# Patient Record
Sex: FEMALE | Race: WHITE | NOT HISPANIC OR LATINO | URBAN - METROPOLITAN AREA
[De-identification: names, ages, dates, MRNs, and addresses within clinical notes are randomized per-mention and may not be internally consistent; named-entity substitution may affect disease eponyms.]

---

## 2018-12-07 ENCOUNTER — OUTPATIENT (OUTPATIENT)
Dept: OUTPATIENT SERVICES | Facility: HOSPITAL | Age: 48
LOS: 1 days | End: 2018-12-07
Payer: COMMERCIAL

## 2018-12-07 ENCOUNTER — APPOINTMENT (OUTPATIENT)
Dept: ULTRASOUND IMAGING | Facility: CLINIC | Age: 48
End: 2018-12-07
Payer: COMMERCIAL

## 2018-12-07 ENCOUNTER — APPOINTMENT (OUTPATIENT)
Dept: MAMMOGRAPHY | Facility: CLINIC | Age: 48
End: 2018-12-07
Payer: COMMERCIAL

## 2018-12-07 DIAGNOSIS — Z00.8 ENCOUNTER FOR OTHER GENERAL EXAMINATION: ICD-10-CM

## 2018-12-07 PROCEDURE — 77063 BREAST TOMOSYNTHESIS BI: CPT | Mod: 26

## 2018-12-07 PROCEDURE — 76641 ULTRASOUND BREAST COMPLETE: CPT | Mod: 26,50

## 2018-12-07 PROCEDURE — 76641 ULTRASOUND BREAST COMPLETE: CPT

## 2018-12-07 PROCEDURE — 77067 SCR MAMMO BI INCL CAD: CPT | Mod: 26

## 2018-12-07 PROCEDURE — 77067 SCR MAMMO BI INCL CAD: CPT

## 2018-12-07 PROCEDURE — 77063 BREAST TOMOSYNTHESIS BI: CPT

## 2020-09-09 ENCOUNTER — APPOINTMENT (OUTPATIENT)
Dept: INTERNAL MEDICINE | Facility: CLINIC | Age: 50
End: 2020-09-09
Payer: COMMERCIAL

## 2020-09-09 ENCOUNTER — NON-APPOINTMENT (OUTPATIENT)
Age: 50
End: 2020-09-09

## 2020-09-09 VITALS — BODY MASS INDEX: 21.93 KG/M2 | WEIGHT: 130 LBS | TEMPERATURE: 98 F | HEIGHT: 64.5 IN

## 2020-09-09 VITALS — RESPIRATION RATE: 14 BRPM | SYSTOLIC BLOOD PRESSURE: 120 MMHG | HEART RATE: 72 BPM | DIASTOLIC BLOOD PRESSURE: 70 MMHG

## 2020-09-09 DIAGNOSIS — J30.2 OTHER SEASONAL ALLERGIC RHINITIS: ICD-10-CM

## 2020-09-09 DIAGNOSIS — Z82.3 FAMILY HISTORY OF STROKE: ICD-10-CM

## 2020-09-09 DIAGNOSIS — Z83.0 FAMILY HISTORY OF HUMAN IMMUNODEFICIENCY VIRUS [HIV] DISEASE: ICD-10-CM

## 2020-09-09 DIAGNOSIS — Z23 ENCOUNTER FOR IMMUNIZATION: ICD-10-CM

## 2020-09-09 DIAGNOSIS — J45.909 UNSPECIFIED ASTHMA, UNCOMPLICATED: ICD-10-CM

## 2020-09-09 DIAGNOSIS — Z72.0 TOBACCO USE: ICD-10-CM

## 2020-09-09 DIAGNOSIS — Z82.0 FAMILY HISTORY OF EPILEPSY AND OTHER DISEASES OF THE NERVOUS SYSTEM: ICD-10-CM

## 2020-09-09 DIAGNOSIS — Z86.19 PERSONAL HISTORY OF OTHER INFECTIOUS AND PARASITIC DISEASES: ICD-10-CM

## 2020-09-09 PROCEDURE — 99203 OFFICE O/P NEW LOW 30 MIN: CPT | Mod: 25

## 2020-09-09 PROCEDURE — 93000 ELECTROCARDIOGRAM COMPLETE: CPT | Mod: 59

## 2020-09-09 PROCEDURE — G0444 DEPRESSION SCREEN ANNUAL: CPT | Mod: NC,59

## 2020-09-09 PROCEDURE — 36415 COLL VENOUS BLD VENIPUNCTURE: CPT

## 2020-09-09 PROCEDURE — 99386 PREV VISIT NEW AGE 40-64: CPT | Mod: 25

## 2020-09-09 RX ORDER — BUPROPION HYDROCHLORIDE 75 MG/1
75 TABLET, FILM COATED ORAL
Refills: 0 | Status: DISCONTINUED | COMMUNITY
Start: 2020-09-09 | End: 2020-09-09

## 2020-09-09 NOTE — ASSESSMENT
[FreeTextEntry1] : Blood work was drawn and sent to the lab today. The patient has been instructed to call the office next week to discuss today's lab work.\par \par Begin cymbalta 20mg daily\par D/C wellbutrin\par \par D/C vaping\par Routine OBGYN / Mammogram/ Breast sono\par \par Routine health counselling provided\par Annual CC\par F/U one month to reassess cymbalta.

## 2020-09-09 NOTE — PHYSICAL EXAM
[No Acute Distress] : no acute distress [Well Nourished] : well nourished [Well Developed] : well developed [Well-Appearing] : well-appearing [PERRL] : pupils equal round and reactive to light [Normal Sclera/Conjunctiva] : normal sclera/conjunctiva [EOMI] : extraocular movements intact [Normal Outer Ear/Nose] : the outer ears and nose were normal in appearance [Normal Oropharynx] : the oropharynx was normal [No JVD] : no jugular venous distention [Thyroid Normal, No Nodules] : the thyroid was normal and there were no nodules present [No Lymphadenopathy] : no lymphadenopathy [Supple] : supple [No Accessory Muscle Use] : no accessory muscle use [No Respiratory Distress] : no respiratory distress  [Clear to Auscultation] : lungs were clear to auscultation bilaterally [Normal Rate] : normal rate  [Regular Rhythm] : with a regular rhythm [No Carotid Bruits] : no carotid bruits [No Murmur] : no murmur heard [Normal S1, S2] : normal S1 and S2 [No Abdominal Bruit] : a ~M bruit was not heard ~T in the abdomen [Pedal Pulses Present] : the pedal pulses are present [No Varicosities] : no varicosities [No Palpable Aorta] : no palpable aorta [No Extremity Clubbing/Cyanosis] : no extremity clubbing/cyanosis [No Edema] : there was no peripheral edema [No Nipple Discharge] : no nipple discharge [No Axillary Lymphadenopathy] : no axillary lymphadenopathy [Normal Appearance] : normal in appearance [Non-distended] : non-distended [Soft] : abdomen soft [Non Tender] : non-tender [Normal Posterior Cervical Nodes] : no posterior cervical lymphadenopathy [No Masses] : no abdominal mass palpated [Normal Bowel Sounds] : normal bowel sounds [No HSM] : no HSM [Normal Anterior Cervical Nodes] : no anterior cervical lymphadenopathy [No CVA Tenderness] : no CVA  tenderness [No Spinal Tenderness] : no spinal tenderness [No Joint Swelling] : no joint swelling [No Rash] : no rash [Grossly Normal Strength/Tone] : grossly normal strength/tone [Normal Gait] : normal gait [Coordination Grossly Intact] : coordination grossly intact [Normal Affect] : the affect was normal [No Focal Deficits] : no focal deficits [Normal Insight/Judgement] : insight and judgment were intact [de-identified] : s/p b/l reduction [de-identified] : EDWARD Navarro last pap 2/20 neg

## 2020-09-09 NOTE — HISTORY OF PRESENT ILLNESS
[FreeTextEntry1] : HAMLET DAVIS is a 50 year old female  presents for an annual physical. Patient is also here for f/u of chronic medical conditions, which have been stable on medications. These include anxiety\par  [de-identified] : Having worsening anxiety - fear like symptoms\par taking xanax almost daily.

## 2020-09-09 NOTE — HEALTH RISK ASSESSMENT
[Very Good] : ~his/her~ current health as very good [No falls in past year] : Patient reported no falls in the past year [No] : No [0] : 2) Feeling down, depressed, or hopeless: Not at all (0) [Patient reported mammogram was normal] : Patient reported mammogram was normal [Patient reported PAP Smear was normal] : Patient reported PAP Smear was normal [HIV test declined] : HIV test declined [Hepatitis C test declined] : Hepatitis C test declined [Employed] : employed [With Significant Other] : lives with significant other [# Of Children ___] : has [unfilled] children [] :  [Sexually Active] : sexually active [Fully functional (bathing, dressing, toileting, transferring, walking, feeding)] : Fully functional (bathing, dressing, toileting, transferring, walking, feeding) [Fully functional (using the telephone, shopping, preparing meals, housekeeping, doing laundry, using] : Fully functional and needs no help or supervision to perform IADLs (using the telephone, shopping, preparing meals, housekeeping, doing laundry, using transportation, managing medications and managing finances) [de-identified] : yoga [] : No [Reports changes in vision] : Reports no changes in vision [Reports changes in dental health] : Reports no changes in dental health [Reports changes in hearing] : Reports no changes in hearing [MammogramDate] : 09/19 [PapSmearDate] : 02/20 [de-identified] : last eye exam within 12 months

## 2020-09-24 ENCOUNTER — TRANSCRIPTION ENCOUNTER (OUTPATIENT)
Age: 50
End: 2020-09-24

## 2020-09-24 ENCOUNTER — APPOINTMENT (OUTPATIENT)
Dept: MAMMOGRAPHY | Facility: CLINIC | Age: 50
End: 2020-09-24
Payer: COMMERCIAL

## 2020-09-24 ENCOUNTER — APPOINTMENT (OUTPATIENT)
Dept: ULTRASOUND IMAGING | Facility: CLINIC | Age: 50
End: 2020-09-24
Payer: COMMERCIAL

## 2020-09-24 ENCOUNTER — OUTPATIENT (OUTPATIENT)
Dept: OUTPATIENT SERVICES | Facility: HOSPITAL | Age: 50
LOS: 1 days | End: 2020-09-24
Payer: COMMERCIAL

## 2020-09-24 DIAGNOSIS — Z00.8 ENCOUNTER FOR OTHER GENERAL EXAMINATION: ICD-10-CM

## 2020-09-24 PROCEDURE — 77063 BREAST TOMOSYNTHESIS BI: CPT | Mod: 26

## 2020-09-24 PROCEDURE — 76641 ULTRASOUND BREAST COMPLETE: CPT | Mod: 26,50

## 2020-09-24 PROCEDURE — 77067 SCR MAMMO BI INCL CAD: CPT

## 2020-09-24 PROCEDURE — 77063 BREAST TOMOSYNTHESIS BI: CPT

## 2020-09-24 PROCEDURE — 76641 ULTRASOUND BREAST COMPLETE: CPT

## 2020-09-24 PROCEDURE — 77067 SCR MAMMO BI INCL CAD: CPT | Mod: 26

## 2020-09-29 ENCOUNTER — EMERGENCY (EMERGENCY)
Facility: HOSPITAL | Age: 50
LOS: 1 days | Discharge: HOME | End: 2020-09-29
Attending: EMERGENCY MEDICINE
Payer: COMMERCIAL

## 2020-09-29 VITALS
SYSTOLIC BLOOD PRESSURE: 124 MMHG | TEMPERATURE: 98 F | RESPIRATION RATE: 18 BRPM | DIASTOLIC BLOOD PRESSURE: 74 MMHG | WEIGHT: 130.07 LBS | HEIGHT: 64 IN | OXYGEN SATURATION: 98 % | HEART RATE: 84 BPM

## 2020-09-29 LAB
BASE EXCESS BLDV CALC-SCNC: -0.8 MMOL/L — SIGNIFICANT CHANGE UP (ref -2–2)
BASE EXCESS BLDV CALC-SCNC: -3.6 MMOL/L — LOW (ref -2–2)
BASOPHILS # BLD AUTO: 0.04 K/UL — SIGNIFICANT CHANGE UP (ref 0–0.2)
BASOPHILS NFR BLD AUTO: 0.4 % — SIGNIFICANT CHANGE UP (ref 0–2)
CA-I SERPL-SCNC: 1.04 MMOL/L — LOW (ref 1.12–1.3)
CA-I SERPL-SCNC: 1.04 MMOL/L — LOW (ref 1.12–1.3)
CHLORIDE BLDV-SCNC: 110 MMOL/L — HIGH (ref 96–108)
CHLORIDE BLDV-SCNC: 113 MMOL/L — HIGH (ref 96–108)
CO2 BLDV-SCNC: 18 MMOL/L — LOW (ref 22–30)
CO2 BLDV-SCNC: 24 MMOL/L — SIGNIFICANT CHANGE UP (ref 22–30)
EOSINOPHIL # BLD AUTO: 0.09 K/UL — SIGNIFICANT CHANGE UP (ref 0–0.5)
EOSINOPHIL NFR BLD AUTO: 0.8 % — SIGNIFICANT CHANGE UP (ref 0–6)
GAS PNL BLDV: 129 MMOL/L — LOW (ref 135–145)
GAS PNL BLDV: 132 MMOL/L — LOW (ref 135–145)
GAS PNL BLDV: SIGNIFICANT CHANGE UP
GLUCOSE BLDV-MCNC: 145 MG/DL — HIGH (ref 70–99)
GLUCOSE BLDV-MCNC: 99 MG/DL — SIGNIFICANT CHANGE UP (ref 70–99)
HCG SERPL-ACNC: <2 MIU/ML — SIGNIFICANT CHANGE UP
HCO3 BLDV-SCNC: 17 MMOL/L — LOW (ref 21–29)
HCO3 BLDV-SCNC: 23 MMOL/L — SIGNIFICANT CHANGE UP (ref 21–29)
HCT VFR BLD CALC: 41.4 % — SIGNIFICANT CHANGE UP (ref 34.5–45)
HCT VFR BLDA CALC: 41 % — SIGNIFICANT CHANGE UP (ref 39–50)
HCT VFR BLDA CALC: 47 % — SIGNIFICANT CHANGE UP (ref 39–50)
HGB BLD CALC-MCNC: 13.4 G/DL — SIGNIFICANT CHANGE UP (ref 11.5–15.5)
HGB BLD CALC-MCNC: 15.3 G/DL — SIGNIFICANT CHANGE UP (ref 11.5–15.5)
HGB BLD-MCNC: 14 G/DL — SIGNIFICANT CHANGE UP (ref 11.5–15.5)
IMM GRANULOCYTES NFR BLD AUTO: 0.4 % — SIGNIFICANT CHANGE UP (ref 0–1.5)
LACTATE BLDV-MCNC: 1.6 MMOL/L — SIGNIFICANT CHANGE UP (ref 0.7–2)
LACTATE BLDV-MCNC: 4.9 MMOL/L — CRITICAL HIGH (ref 0.7–2)
LYMPHOCYTES # BLD AUTO: 1.77 K/UL — SIGNIFICANT CHANGE UP (ref 1–3.3)
LYMPHOCYTES # BLD AUTO: 15.8 % — SIGNIFICANT CHANGE UP (ref 13–44)
MAGNESIUM SERPL-MCNC: 1.8 MG/DL — SIGNIFICANT CHANGE UP (ref 1.6–2.6)
MCHC RBC-ENTMCNC: 29.8 PG — SIGNIFICANT CHANGE UP (ref 27–34)
MCHC RBC-ENTMCNC: 33.8 GM/DL — SIGNIFICANT CHANGE UP (ref 32–36)
MCV RBC AUTO: 88.1 FL — SIGNIFICANT CHANGE UP (ref 80–100)
MONOCYTES # BLD AUTO: 0.37 K/UL — SIGNIFICANT CHANGE UP (ref 0–0.9)
MONOCYTES NFR BLD AUTO: 3.3 % — SIGNIFICANT CHANGE UP (ref 2–14)
NEUTROPHILS # BLD AUTO: 8.86 K/UL — HIGH (ref 1.8–7.4)
NEUTROPHILS NFR BLD AUTO: 79.3 % — HIGH (ref 43–77)
NRBC # BLD: 0 /100 WBCS — SIGNIFICANT CHANGE UP (ref 0–0)
PCO2 BLDV: 22 MMHG — LOW (ref 35–50)
PCO2 BLDV: 35 MMHG — SIGNIFICANT CHANGE UP (ref 35–50)
PH BLDV: 7.43 — SIGNIFICANT CHANGE UP (ref 7.35–7.45)
PH BLDV: 7.5 — HIGH (ref 7.35–7.45)
PHOSPHATE SERPL-MCNC: 1 MG/DL — CRITICAL LOW (ref 2.5–4.5)
PLATELET # BLD AUTO: 259 K/UL — SIGNIFICANT CHANGE UP (ref 150–400)
PO2 BLDV: 33 MMHG — SIGNIFICANT CHANGE UP (ref 25–45)
PO2 BLDV: 36 MMHG — SIGNIFICANT CHANGE UP (ref 25–45)
POTASSIUM BLDV-SCNC: 3.1 MMOL/L — LOW (ref 3.5–5.3)
POTASSIUM BLDV-SCNC: 5.4 MMOL/L — HIGH (ref 3.5–5.3)
RBC # BLD: 4.7 M/UL — SIGNIFICANT CHANGE UP (ref 3.8–5.2)
RBC # FLD: 12.7 % — SIGNIFICANT CHANGE UP (ref 10.3–14.5)
SAO2 % BLDV: 69 % — SIGNIFICANT CHANGE UP (ref 67–88)
SAO2 % BLDV: 70 % — SIGNIFICANT CHANGE UP (ref 67–88)
WBC # BLD: 11.18 K/UL — HIGH (ref 3.8–10.5)
WBC # FLD AUTO: 11.18 K/UL — HIGH (ref 3.8–10.5)

## 2020-09-29 PROCEDURE — 70496 CT ANGIOGRAPHY HEAD: CPT | Mod: 26

## 2020-09-29 PROCEDURE — 93010 ELECTROCARDIOGRAM REPORT: CPT

## 2020-09-29 PROCEDURE — 99285 EMERGENCY DEPT VISIT HI MDM: CPT

## 2020-09-29 PROCEDURE — 99244 OFF/OP CNSLTJ NEW/EST MOD 40: CPT | Mod: GC

## 2020-09-29 RX ORDER — POTASSIUM PHOSPHATE, MONOBASIC POTASSIUM PHOSPHATE, DIBASIC 236; 224 MG/ML; MG/ML
30 INJECTION, SOLUTION INTRAVENOUS ONCE
Refills: 0 | Status: COMPLETED | OUTPATIENT
Start: 2020-09-29 | End: 2020-09-29

## 2020-09-29 RX ORDER — SODIUM,POTASSIUM PHOSPHATES 278-250MG
1 POWDER IN PACKET (EA) ORAL ONCE
Refills: 0 | Status: COMPLETED | OUTPATIENT
Start: 2020-09-29 | End: 2020-09-29

## 2020-09-29 RX ORDER — MECLIZINE HCL 12.5 MG
25 TABLET ORAL ONCE
Refills: 0 | Status: COMPLETED | OUTPATIENT
Start: 2020-09-29 | End: 2020-09-29

## 2020-09-29 RX ORDER — SODIUM CHLORIDE 9 MG/ML
1000 INJECTION, SOLUTION INTRAVENOUS ONCE
Refills: 0 | Status: COMPLETED | OUTPATIENT
Start: 2020-09-29 | End: 2020-09-29

## 2020-09-29 RX ORDER — ACETAMINOPHEN 500 MG
975 TABLET ORAL ONCE
Refills: 0 | Status: COMPLETED | OUTPATIENT
Start: 2020-09-29 | End: 2020-09-29

## 2020-09-29 RX ORDER — ONDANSETRON 8 MG/1
4 TABLET, FILM COATED ORAL ONCE
Refills: 0 | Status: COMPLETED | OUTPATIENT
Start: 2020-09-29 | End: 2020-09-29

## 2020-09-29 RX ADMIN — Medication 975 MILLIGRAM(S): at 23:45

## 2020-09-29 RX ADMIN — Medication 25 MILLIGRAM(S): at 17:48

## 2020-09-29 RX ADMIN — Medication 25 MILLIGRAM(S): at 21:26

## 2020-09-29 RX ADMIN — POTASSIUM PHOSPHATE, MONOBASIC POTASSIUM PHOSPHATE, DIBASIC 83.33 MILLIMOLE(S): 236; 224 INJECTION, SOLUTION INTRAVENOUS at 18:50

## 2020-09-29 RX ADMIN — Medication 25 MILLIGRAM(S): at 23:01

## 2020-09-29 RX ADMIN — SODIUM CHLORIDE 1000 MILLILITER(S): 9 INJECTION, SOLUTION INTRAVENOUS at 17:52

## 2020-09-29 RX ADMIN — Medication 1 TABLET(S): at 19:49

## 2020-09-29 RX ADMIN — Medication 25 MILLIGRAM(S): at 18:50

## 2020-09-29 RX ADMIN — SODIUM CHLORIDE 1000 MILLILITER(S): 9 INJECTION, SOLUTION INTRAVENOUS at 16:27

## 2020-09-29 NOTE — ED PROVIDER NOTE - ATTENDING CONTRIBUTION TO CARE
51 y/o F with no reported medical comorbidities, on cymbalta for anxiety, presenting with sudden onset dizziness (lightheaded and then room-spinning) nausea and vomiting; with symptoms much worse with movement, most c/w peripheral vertigo; possible food poisoning given temporal proximity to "off" tasting salad; Rightward nystagmus but no other focal neurologic deficits noted on exam (not c/w stroke/cva and no apparent risk factors for CVA elicited on history).  Will obtain labs: cbc (to evaluate for leukocytosis or anemia), CMP (to evaluate for electrolyte abnormalities or renal/liver dysfunction), hcg (r/o pregnancy) and trial zofran/meclizine.  Screening ECG (very low suspicion for ACS).  R/a after medication.

## 2020-09-29 NOTE — ED PROVIDER NOTE - CONSTITUTIONAL, MLM
normal... Awake/alert, speaking in full sentences, cooperative with exam.  Closing eyes during interview and holding emesis bag, appears uncomfortable from nausea but is not in acute distress.

## 2020-09-29 NOTE — ED PROVIDER NOTE - CLINICAL SUMMARY MEDICAL DECISION MAKING FREE TEXT BOX
Attending note (Paresh): 51 y/o F with no reported medical comorbidities, on cymbalta for anxiety, presenting with sudden onset dizziness (lightheaded and then room-spinning) nausea and vomiting; with symptoms much worse with movement, most c/w peripheral vertigo; possible food poisoning given temporal proximity to "off" tasting salad; no focal neurologic deficits noted on exam (not c/w stroke/cva and no apparent risk factors for CVA elicited on history).  Will obtain labs: cbc (to evaluate for leukocytosis or anemia), CMP (to evaluate for electrolyte abnormalities or renal/liver dysfunction), hcg (r/o pregnancy) and trial zofran/meclizine.  Screening ECG (very low suspicion for ACS).  R/a after medication. Attending note (Paresh): 51 y/o F with no reported medical comorbidities, on cymbalta for anxiety, presenting with sudden onset dizziness (lightheaded and then room-spinning) nausea and vomiting; with symptoms much worse with movement, most c/w peripheral vertigo; possible food poisoning given temporal proximity to "off" tasting salad; Rightward nystagmus but no other focal neurologic deficits noted on exam (not c/w stroke/cva and no apparent risk factors for CVA elicited on history).  Will obtain labs: cbc (to evaluate for leukocytosis or anemia), CMP (to evaluate for electrolyte abnormalities or renal/liver dysfunction), hcg (r/o pregnancy) and trial zofran/meclizine.  Screening ECG (very low suspicion for ACS).  R/a after medication.

## 2020-09-29 NOTE — ED ADULT NURSE NOTE - OBJECTIVE STATEMENT
50y F denies PMHx BIBEMS from work c/o intermittent N/V & dizziness x2 hr. Pt states that felt otherwise healthy today, but reports feeling sudden onset nausea & had 1 episode of nonbloody vomit at work. Pt reports feeling diaphoretic at time of episode. Denies CP, SOB, H/A, N/V/D, abdominal pain, f/c, dizziness, & urinary symptoms. Per EMS, pt received 4 mg Zofran en route, & upon presentation, pt had another episode of nonbloody vomiting. A&Ox4. Lungs CTA & no respiratory distress noted on RA. EKG obtained & cardiac monitor applied. Abdomen soft, nondistended, & nontender to palpation. Skin warm, dry, & intact. MAEx4. No LE edema noted on exam. Pt resting comfortably with no complaints at this time. Pt's bed in the lowest position & explained POC to pt. Will continue to reassess.

## 2020-09-29 NOTE — ED PROVIDER NOTE - EYES, MLM
Clear bilaterally, pupils equal, round and reactive to light.  EOMI (no nystagmus with EOM but states much more dizzy and nauseous with eye movement) Clear bilaterally, pupils equal, round and reactive to light.  EOMI (R nystagmus with EOM but states much more dizzy and nauseous with eye movement)

## 2020-09-29 NOTE — ED PROVIDER NOTE - OBJECTIVE STATEMENT
Attending note (Paresh): 51 y/o F with no reported medical comorbidities, on cymbalta for anxiety, presenting with sudden onset dizziness (lightheaded and then room-spinning) nausea and vomiting; began suddenly while at work today (works in [employer]).  No fever but felt very warm; then lightheaded and nauseous, laid down on bathroom floor in front of toilet, vomiting (Nb/Nb) and coworker called EMS.  No chest/abdomen pain.  No SOB.  While laying on floor no longer felt lightheaded but felt like room was spinning and any movement of head worsened symptoms.  No fever/chills.  No recent travel.  No h/o recent cough/congestion/SOB/fever.  No known sick contacts.  Ate a broccoli salad 30 minutes prior to onset of symptoms, but states only had ~2 bites as it tasted "off." (no one else ate salad, no family members or coworkers with similar symptoms).  Rec'd zofran 4mg by EMS en route to ED with minimal improvement.

## 2020-09-29 NOTE — ED PROVIDER NOTE - PROGRESS NOTE DETAILS
Attending note (Paresh): patient feeling much better.  Labs reviewed, initial lactate 4 (likely 2/2 vomiting; not c/w sepsis).  Will replete phosphorus (per review, has been using OTC stool softeners and laxatives, possible source of hypophosphatemia).  Continue meclizine prn, and if able ot walk, tolerate po, will be stable for dc.  ECG notable for incomplete RBBB of unclear significance (presentation is not c/w ACS; will recommend outpatient f/u for this). Attending note (aPresh): still having difficulty walking, vertiginous; will obtain ct / cta head-neck to eval for mass, vbi (low suspicion but given incomplete resolution of symptoms, will check); and if negative consider CDU for continued medications, iv fluids, repletion of electrolytes. Attending note (Paresh): patient improved but still some dizziness (with sitting now; able to walk); CT/A reads pending; will place in CDU pending reads from CT if no acute pathology; give iv fluids, continue repletion of phosphorus and meclizine prn for vertigo.

## 2020-09-29 NOTE — ED PROVIDER NOTE - NEUROLOGICAL, MLM
Alert and oriented, no focal deficits, no motor or sensory deficits. Alert and oriented, no focal deficits, no motor or sensory deficits.  Rightward nystagmus elicited with ocular movement, extinguishable, also induced with minor head movement.

## 2020-09-30 ENCOUNTER — RX RENEWAL (OUTPATIENT)
Age: 50
End: 2020-09-30

## 2020-09-30 VITALS
HEART RATE: 62 BPM | SYSTOLIC BLOOD PRESSURE: 105 MMHG | TEMPERATURE: 98 F | OXYGEN SATURATION: 99 % | RESPIRATION RATE: 18 BRPM | DIASTOLIC BLOOD PRESSURE: 68 MMHG

## 2020-09-30 DIAGNOSIS — Z98.890 OTHER SPECIFIED POSTPROCEDURAL STATES: Chronic | ICD-10-CM

## 2020-09-30 LAB
ANION GAP SERPL CALC-SCNC: 8 MMOL/L — SIGNIFICANT CHANGE UP (ref 5–17)
BUN SERPL-MCNC: 10 MG/DL — SIGNIFICANT CHANGE UP (ref 7–23)
CALCIUM SERPL-MCNC: 8.8 MG/DL — SIGNIFICANT CHANGE UP (ref 8.4–10.5)
CHLORIDE SERPL-SCNC: 108 MMOL/L — SIGNIFICANT CHANGE UP (ref 96–108)
CO2 SERPL-SCNC: 22 MMOL/L — SIGNIFICANT CHANGE UP (ref 22–31)
CREAT SERPL-MCNC: 0.8 MG/DL — SIGNIFICANT CHANGE UP (ref 0.5–1.3)
GLUCOSE SERPL-MCNC: 88 MG/DL — SIGNIFICANT CHANGE UP (ref 70–99)
MAGNESIUM SERPL-MCNC: 1.9 MG/DL — SIGNIFICANT CHANGE UP (ref 1.6–2.6)
PHOSPHATE SERPL-MCNC: 4.3 MG/DL — SIGNIFICANT CHANGE UP (ref 2.5–4.5)
POTASSIUM SERPL-MCNC: 3.6 MMOL/L — SIGNIFICANT CHANGE UP (ref 3.5–5.3)
POTASSIUM SERPL-SCNC: 3.6 MMOL/L — SIGNIFICANT CHANGE UP (ref 3.5–5.3)
SARS-COV-2 RNA SPEC QL NAA+PROBE: SIGNIFICANT CHANGE UP
SODIUM SERPL-SCNC: 138 MMOL/L — SIGNIFICANT CHANGE UP (ref 135–145)

## 2020-09-30 PROCEDURE — 85014 HEMATOCRIT: CPT

## 2020-09-30 PROCEDURE — 99236 HOSP IP/OBS SAME DATE HI 85: CPT

## 2020-09-30 PROCEDURE — 70496 CT ANGIOGRAPHY HEAD: CPT

## 2020-09-30 PROCEDURE — 80048 BASIC METABOLIC PNL TOTAL CA: CPT

## 2020-09-30 PROCEDURE — 93005 ELECTROCARDIOGRAM TRACING: CPT | Mod: XU

## 2020-09-30 PROCEDURE — 80053 COMPREHEN METABOLIC PANEL: CPT

## 2020-09-30 PROCEDURE — 84702 CHORIONIC GONADOTROPIN TEST: CPT

## 2020-09-30 PROCEDURE — 82435 ASSAY OF BLOOD CHLORIDE: CPT

## 2020-09-30 PROCEDURE — 83605 ASSAY OF LACTIC ACID: CPT

## 2020-09-30 PROCEDURE — 70450 CT HEAD/BRAIN W/O DYE: CPT

## 2020-09-30 PROCEDURE — 85025 COMPLETE CBC W/AUTO DIFF WBC: CPT

## 2020-09-30 PROCEDURE — 99284 EMERGENCY DEPT VISIT MOD MDM: CPT | Mod: 25

## 2020-09-30 PROCEDURE — 70498 CT ANGIOGRAPHY NECK: CPT

## 2020-09-30 PROCEDURE — 70450 CT HEAD/BRAIN W/O DYE: CPT | Mod: 26

## 2020-09-30 PROCEDURE — 84295 ASSAY OF SERUM SODIUM: CPT

## 2020-09-30 PROCEDURE — 85018 HEMOGLOBIN: CPT

## 2020-09-30 PROCEDURE — 84132 ASSAY OF SERUM POTASSIUM: CPT

## 2020-09-30 PROCEDURE — 82330 ASSAY OF CALCIUM: CPT

## 2020-09-30 PROCEDURE — U0003: CPT

## 2020-09-30 PROCEDURE — 83735 ASSAY OF MAGNESIUM: CPT

## 2020-09-30 PROCEDURE — 82947 ASSAY GLUCOSE BLOOD QUANT: CPT

## 2020-09-30 PROCEDURE — 84100 ASSAY OF PHOSPHORUS: CPT

## 2020-09-30 PROCEDURE — G0378: CPT

## 2020-09-30 PROCEDURE — 82803 BLOOD GASES ANY COMBINATION: CPT

## 2020-09-30 PROCEDURE — 70498 CT ANGIOGRAPHY NECK: CPT | Mod: 26

## 2020-09-30 RX ORDER — CLONAZEPAM 1 MG
0.5 TABLET ORAL ONCE
Refills: 0 | Status: DISCONTINUED | OUTPATIENT
Start: 2020-09-30 | End: 2020-10-03

## 2020-09-30 RX ORDER — MECLIZINE HCL 12.5 MG
1 TABLET ORAL
Qty: 15 | Refills: 0
Start: 2020-09-30 | End: 2020-10-04

## 2020-09-30 NOTE — ED CDU PROVIDER DISPOSITION NOTE - NSFOLLOWUPINSTRUCTIONS_ED_ALL_ED_FT
(1) You will need to follow up with your PMD and our recommended neurologist (____) in 2-3 days for your _____. A copy of your results were given to you to bring to your appt.  (2) Continue your home medications as directed.  (3) Drink plenty of fluids to stay hydrated.  (4) Read attached discharge paperwork.  (5) Return to ER for headache, confusion, behavior/speech changes, numbness/tingling/weakness in your arms/legs, or any other concerns. (1) You will need to follow up with your PMD and our recommended neurologist Dr. Zainab Hardin in 2-3 days for your vertigo. A copy of your results were given to you to bring to your appt.  (2) Continue your home medications as directed, and you  may take meclizine 1 tablet up to every 8 hours as needed for dizziness if your symptoms recur.   (3) Drink plenty of fluids to stay hydrated.  (4) Read attached discharge paperwork.  (5) Return to ER for headache, confusion, behavior/speech changes, numbness/tingling/weakness in your arms/legs, or any other concerns.

## 2020-09-30 NOTE — ED ADULT NURSE REASSESSMENT NOTE - ANCILLARY STATUS
frequent reeval, vitals q 4hrs, tele, neurochecks q 4hrs, supp lytes, recheck labs. symptomatic therapy. Neuro following

## 2020-09-30 NOTE — ED CDU PROVIDER INITIAL DAY NOTE - PROGRESS NOTE DETAILS
CDU PROGRESS NOTE YOJANA BARAJAS: Pt resting comfortably, NAD, VSS. No events on telemetry. Pt is aox3, speaking coherently, no focal neuro deficits. Pt denies dizziness or lightness and states she is feeling better. Will repeat labs and continue to monitor. CDU PROGRESS NOTE YOJANA CHAU: Pt resting comfortably, feeling well without complaint. NAD, VSS. No events on telemetry. Patient stating that all dizziness/lightheadedness is resolved. Results of repeat bloodwork show adequate repletion of phosphorus. Patient pending reevaluation by neurology attending this AM, and then likely stable for d/c with follow-up outpatient for vestibular therapy and continued vertigo treatment. Attn - pt feeling much better.  Still feels slightly foggy. no vertigo. able to ambulate without difficulty.  awaiting Neuro reevaluation. Patient reevaluated by neurology with attending Dr. Harrison. Reporting m/p episode of vertigo, and do not recommend MRI for patient at this time. Recommending meclizine and discharge home with vesticular rehab, and follow-up with Dr. Zainab Hardin. Patient informed and understands and agrees with plan for discharge, feeling well and wishing to go home. Strict return precautions provided, and stable for d/c d/w Dr. Langley. -Jannie Harrington PA-C

## 2020-09-30 NOTE — ED CDU PROVIDER DISPOSITION NOTE - PATIENT PORTAL LINK FT
You can access the FollowMyHealth Patient Portal offered by [facility] by registering at the following website: [email or web address]. By joining [employer]’s FollowMyHealth portal, you will also be able to view your health information using other applications (apps) compatible with our system.

## 2020-09-30 NOTE — CONSULT NOTE ADULT - ATTENDING COMMENTS
Patient seen and examined with neurology team and above note reviewed and I agree with assessment and plan as outlined. Patient with hx as noted and presents with acute vertigo and made worse with head movements, eye opening and sudden movements. NO recent infection or inner ear pain, or other acute symptoms. No trauma.  On exam she has no nystagmus and saccades are normal. No motor weakness or sensory loss and no dysmetria.  However on balance testing she has right > left vestibular dysfunction of the right posterior canal and horizontal canal.    CT head and CTA of neck and head were normal     Impression and Plan: likely vestibular dysfunction and may be due to BPPV    1) May use meclizine as needed 12.5 mg BID and     2) Increase Hydration    3) Follow up with vestibular therapy outpatient and neurology at [address] [place] [address], [place] at [phone]    All questions answered and patient understood plan and is willing to comply.

## 2020-09-30 NOTE — ED CDU PROVIDER INITIAL DAY NOTE - DETAILS
50 y.o female c/o dizziness x yesterday found to have hypophosphatemia   Plan : frequent reeval, vitals q 4hrs, tele, neurochecks q 4hrs, supp lytes, recheck labs. symptomatic therapy. Neuro following

## 2020-09-30 NOTE — ED CDU PROVIDER INITIAL DAY NOTE - ATTENDING CONTRIBUTION TO CARE
49 y/o F with no reported medical comorbidities, on cymbalta for anxiety, presenting with sudden onset dizziness (lightheaded and then room-spinning) nausea and vomiting; with symptoms much worse with movement, most c/w peripheral vertigo; possible food poisoning given temporal proximity to "off" tasting salad; Rightward nystagmus but no other focal neurologic deficits noted on exam (not c/w stroke/cva and no apparent risk factors for CVA elicited on history).  Labs obtained, notable for initial elevated lactate which rapidly resolved with iv fluid bolus.  Vertigo improved but did not resolve with initial meclizine; and labs also notable for phosphorus of 1 (likely due to bowel regimen used by patient).  given persistence of symptoms CTA obtained, and shows no acute pathology (no evidence of acute pathology on CT head or CTA head/neck).      Will plan now for observation in CDU for iv fluids, continue repletion of phosphorus and meclizine prn for vertigo.

## 2020-09-30 NOTE — CONSULT NOTE ADULT - ASSESSMENT
Patient is a 49 yo F with pmhx of cymbalta who presented to the ED and neurology was consulted for concern of dizziness. Sudden onset room spinning dizziness and nausea around 1400. Associated with headache. Headache resolved with tylenol. Patient given zofran, tylenol, and meclizine 25mg x 4 in ED. Noted to have incomplete RBBB on ekg, with prolonged QT interval. CT findings as below. Neuro exam is normal and non-focal. No hearing loss. Gait is normal. Dizziness likely 2/2 BPPV.     CT brain: No acute intracranial hemorrhage, mass effect, or evidence of acute vascular territorial infarction.    CTA neck and brain: No vessel occlusion or significant stenosis.    Recommendations   [] Epley maneuver  [] Meclizine 12.5mg BID PRN, however caution given ekg findings   [] Clonzepam 0.5mg now then Q8H PRN   [] Reglan 4mg  PRN Q8H  [] Refer for Vestibular Rehab  [] Neurology f/u outpatient [address] [place] [address], [phone]    Case to be discussed and patient to be seen by neurology attending  Dr. Harrison in AM    Patient is a 49 yo F with pmhx of cymbalta who presented to the ED and neurology was consulted for concern of dizziness. Sudden onset room spinning dizziness and nausea around 1400. Associated with headache. Headache resolved with tylenol. Patient given zofran, tylenol, and meclizine 25mg x 4 in ED. Noted to have incomplete RBBB on ekg, with prolonged QT interval. CT findings as below. Neuro exam is normal and non-focal. No hearing loss. Gait is normal. Dizziness likely 2/2 BPPV.     CT brain: No acute intracranial hemorrhage, mass effect, or evidence of acute vascular territorial infarction.    CTA neck and brain: No vessel occlusion or significant stenosis.    Recommendations:    [] Epley maneuver  [] Meclizine 12.5mg BID PRN, however caution given ekg findings   [] Clonzepam 0.5mg now then Q8H PRN   [] Reglan 4mg  PRN Q8H  [] Refer for Vestibular Rehab  [] Neurology f/u outpatient [address] [place] [address], [phone]    Case discussed and patient to be seen by neurology attending  Dr. Harrison in AM

## 2020-09-30 NOTE — ED ADULT NURSE REASSESSMENT NOTE - NS ED NURSE REASSESS COMMENT FT1
Pt able to swallow PO meclizine, tolerated well. Pt states that she is not nauseous while she lies still on her side. Will continue to reassess.
Pt attempted to get up with RN at the bedside to assist with ambulation to the bathroom, but she felt dizzy trying to sit up. Administered meclizine as ordered, & pt states that she will lie down for a little bit & press call bell when she feels ready to either try again or use the bedpan. Will continue to reassess.
Pt neuro check intact, no deficits noted. Will continue to monitor
Report received from ENEL Gomez in [facility]. Pt AxOX3, observed sitting up in stretcher conversing with RN without difficulty. Breathing spontaneous and unlabored with pulse ox >95% on room air. Pt returned from CT scan, ambulated w/ PA Mirro independently w/ steady gait. Pt updated on plan of care awaiting CT read and to be seen by CDU PA for evaluation. Pt denies n/v or dizziness at this time. Pt sat up in stretcher to take tylenol without difficulty. No acute distress noted. Call bell within reach.
Report received from NEEL Gomez in [facility]. Pt being transported to CT at this time.
Report taken from Danielle SHAIKH. states pt have a good night with no complaints. Will continue to monitor.
See paper neuro flow for neuro assessment. Neuro MD at bedside for evaluation.
Pt received from NEEL Mccord. Pt oriented to CDU & plan of care was discussed. Pt A&O x 4. Pt in CDU for frequent reeval, vitals q 4hrs, tele, neurochecks q 4hrs, supp lytes, recheck labs. symptomatic therapy. Neuro following. Pt c/o feeling "foggy". Pt denies any lightheadedness, headache, dizziness, room spinning, or blurred vision as of now. Pt neuro check intact, no deficits noted. Pt on a cardiac monitor in Sinus Bradycardia, HR in 50's. Pt still has IV potassium phos running, tolerating well. V/S stable, pt afebrile, IV in place, patent and free of signs of infiltration. Pt resting in bed. Safety & comfort measures maintained. Call bell in reach. Will continue to monitor.

## 2020-09-30 NOTE — CONSULT NOTE ADULT - SUBJECTIVE AND OBJECTIVE BOX
CHIEF COMPLAINT:Patient is a 50y old  Female who presents with a chief complaint of     HISTORY OF PRESENT ILLNESS:HPI:   49 yo F with pmhx of cymbalta who presented to the ED and neurology was consulted for concern of dizziness. Patient reports that she had sudden onset dizziness and nausea at work around 1400 9/29/20. She reports associated diaphoresis and inability to keep her eyes open due to the room spinning sensation. She states that she had to lay on the floor for a couple hours but symptoms persisted. She denies hx of similar symptoms or stroke. Patient reports HA over her R eye which resolved with tylenol. She states that the dizziness was exacerbated by sitting up and moving her head around. She reports a brief episode of b/l hand n/t which resolved on its own. 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 She has not seen a physician for these as they happen a few times a year and states that they are usually remitted by taking claritin. Patient currently reports significant improvement of her symptoms. Currently denies headache, blurry/double vision, lightheadedness, tinnitus, nausea, cp, sob, n/t, weakness.    PAST MEDICAL & SURGICAL HISTORY:  Anxiety    History of bilateral breast reduction surgery      MEDICATIONS:        clonazePAM  Tablet 0.5 milliGRAM(s) Oral once            FAMILY HISTORY:  No pertinent family history in first degree relatives        Non-contributory    SOCIAL HISTORY:    not a smoker  Allergies    No Known Allergies    Intolerances    	    REVIEW OF SYSTEMS:  CONSTITUTIONAL: No fever  EYES: No eye pain, visual disturbances, or discharge  ENMT:  No difficulty hearing, tinnitus  NECK: No pain or stiffness  RESPIRATORY: No cough, wheezing,  CARDIOVASCULAR: No chest pain, palpitations, passing out, +dizziness, no leg swelling  GASTROINTESTINAL:  No nausea, vomiting, diarrhea or constipation. No melena.  GENITOURINARY: No dysuria, hematuria  NEUROLOGICAL: No stroke like symptoms, +dizziness  SKIN: No burning or lesions   ENDOCRINE: No heat or cold intolerance  MUSCULOSKELETAL: No joint pain or swelling  PSYCHIATRIC: No  anxiety, mood swings  HEME/LYMPH: No bleeding gums  ALLERGY AND IMMUNOLOGIC: No hives or eczema	    All other ROS negative    PHYSICAL EXAM:  T(C): 36.9 (09-30-20 @ 08:33), Max: 36.9 (09-30-20 @ 08:33)  HR: 62 (09-30-20 @ 08:33) (55 - 72)  BP: 105/68 (09-30-20 @ 08:33) (105/68 - 111/53)  RR: 18 (09-30-20 @ 08:33) (16 - 18)  SpO2: 99% (09-30-20 @ 08:33) (98% - 99%)  Wt(kg): --  I&O's Summary      Appearance: Normal	  HEENT:   Normal oral mucosa, EOMI	  Cardiovascular:  S1 S2, No JVD,    Respiratory: Lungs clear to auscultation	  Psychiatry: Alert  Gastrointestinal:  Soft, Non-tender, + BS	  Skin: No rashes   Neurologic: Non-focal  Extremities:  No edema  Vascular: Peripheral pulses palpable    	    	  	  CARDIAC MARKERS:  Labs personally reviewed by me                                  14.0   11.18 )-----------( 259      ( 29 Sep 2020 16:30 )             41.4     09-30    138  |  108  |  10  ----------------------------<  88  3.6   |  22  |  0.80    Ca    8.8      30 Sep 2020 06:06  Phos  4.3     09-30  Mg     1.9     09-30    TPro  6.9  /  Alb  4.6  /  TBili  0.5  /  DBili  x   /  AST  15  /  ALT  7<L>  /  AlkPhos  53  09-29          EKG: Personally reviewed by me - NSR, QTc by computer calculated over 600ms, personally reviewed <500ms      < from: CT Head No Cont (09.30.20 @ 00:43) > Personally reviewed  IMPRESSION:  CT brain: No acute intracranial hemorrhage, mass effect, or evidence of acute vascular territorial infarction.  If clinical symptoms persist or worsen, more sensitive evaluation with brain MRI may be obtained, if no contraindications exist.  CTA neck and brain: No vessel occlusion or significant stenosis.      Assessment /Plan:    49 yo F with pmhx of cymbalta who presented to the ED and neurology was consulted for concern of dizziness. Patient reports that she had sudden onset dizziness and nausea at work around 1400 9/29/20. She reports associated diaphoresis and inability to keep her eyes open due to the room spinning sensation. She states that she had to lay on the floor for a couple hours but symptoms persisted. She denies hx of similar symptoms or stroke. Patient reports HA over her R eye which resolved with tylenol. She states that the dizziness was exacerbated by sitting up and moving her head around. She reports a brief episode of b/l hand n/t which resolved on its own. 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 She has not seen a physician for these as they happen a few times a year and states that they are usually remitted by taking claritin. Patient currently reports significant improvement of her symptoms. Currently denies headache, blurry/double vision, lightheadedness, tinnitus, nausea, cp, sob, n/t, weakness.    1. Dizziness - does not appear to be cardiac in nature and more consistent with ?vertigo, central neurologic etiology ruled out by Neurology  - no objection to Meclizine  - outpt neuro follow up    2. Prolonged QTc - EKG reviewed, seems like computer miscalculates as T waves are low voltage and there are U waves  - in lead III its more clear and Qtc appears to be <500ms      Differential diagnosis and plan of care discussed with patient after the evaluation. Counseling on diet, nutritional counseling, weight management, exercise and medication compliance was done. More than one hundred ten minutes spent on total encounter of which more than fifty percent counseling/coordinating care by the attending physician.        Jaylon Corbin DO [facility]  Cardiovascular Medicine  [address] [place] [address], [address]  Office [phone]  Cell [phone]

## 2020-09-30 NOTE — ED CDU PROVIDER INITIAL DAY NOTE - OBJECTIVE STATEMENT
Pt is 49 y/o F with no reported medical comorbidities, on Cymbalta for anxiety, presenting with sudden onset dizziness (lightheaded and then room-spinning) nausea and vomiting; began suddenly while at work today (works in [employer] shop).  No fever but felt very warm; then lightheaded and nauseous, laid down on bathroom floor in front of toilet, vomiting (Nb/Nb) and coworker called EMS.  No chest/abdomen pain.  No SOB.  While laying on floor no longer felt lightheaded but felt like room was spinning and any movement of head worsened symptoms.  No fever/chills.  No recent travel.  No h/o recent cough/congestion/SOB/fever.  No known sick contacts.    In ED, patient had laboratory significant for Phosphorus 1.0. Pt had CT head, CT angio head and neck showing no signs of acute pathology. Pt was given a total of 100mg Meclizine po in divided doses w/ minimal relief in symptoms. Pt was started on 30 milliMoles of Kphos IVPB and sent to CDU for iv fluids, continue repletion of phosphorus and and symptomatic therapy. Neurology was also consulted.

## 2020-09-30 NOTE — ED CDU PROVIDER DISPOSITION NOTE - CLINICAL COURSE
Pt is 51 y/o F with no reported medical comorbidities, on Cymbalta for anxiety, presenting with sudden onset dizziness (lightheaded and then room-spinning) nausea and vomiting; began suddenly while at work today (works in [employer] shop).  No fever but felt very warm; then lightheaded and nauseous, laid down on bathroom floor in front of toilet, vomiting (Nb/Nb) and coworker called EMS.  No chest/abdomen pain.  No SOB.  While laying on floor no longer felt lightheaded but felt like room was spinning and any movement of head worsened symptoms.  No fever/chills.  No recent travel.  No h/o recent cough/congestion/SOB/fever.  No known sick contacts.    In ED, patient had laboratory significant for Phosphorus 1.0. Pt had CT head, CT angio head and neck showing no signs of acute pathology. Pt was given a total of 100mg Meclizine po in divided doses w/ minimal relief in symptoms. Pt was started on 30 milliMoles of Kphos IVPB and sent to CDU for iv fluids, continue repletion of phosphorus and and symptomatic therapy. Neurology was also consulted. Pt is 49 y/o F with no reported medical comorbidities, on Cymbalta for anxiety, presenting with sudden onset dizziness (lightheaded and then room-spinning) nausea and vomiting; began suddenly while at work today (works in [employer] shop).  No fever but felt very warm; then lightheaded and nauseous, laid down on bathroom floor in front of toilet, vomiting (Nb/Nb) and coworker called EMS.  No chest/abdomen pain.  No SOB.  While laying on floor no longer felt lightheaded but felt like room was spinning and any movement of head worsened symptoms.  No fever/chills.  No recent travel.  No h/o recent cough/congestion/SOB/fever.  No known sick contacts.    In ED, patient had laboratory significant for Phosphorus 1.0. Pt had CT head, CT angio head and neck showing no signs of acute pathology. Pt was given a total of 100mg Meclizine po in divided doses w/ minimal relief in symptoms. Pt was started on 30 milliMoles of Kphos IVPB and sent to CDU for iv fluids, continue repletion of phosphorus and and symptomatic therapy. Neurology was also consulted.  Overnight patients symptoms resolved and her phosphorus was appropriately repleated. Patient reevaluated by neurology with attending Dr. Harrison. Reporting m/p episode of vertigo, and do not recommend MRI for patient at this time. Recommending meclizine and discharge home with vestibular rehab, and follow-up with Dr. Zainab Hardin. Patient informed and understands and agrees with plan for discharge, feeling well and wishing to go home. Strict return precautions provided, and stable for d/c d/w Dr. Langley.

## 2020-10-01 PROBLEM — F41.9 ANXIETY DISORDER, UNSPECIFIED: Chronic | Status: ACTIVE | Noted: 2020-09-30

## 2020-10-02 ENCOUNTER — RX CHANGE (OUTPATIENT)
Age: 50
End: 2020-10-02

## 2020-10-02 ENCOUNTER — APPOINTMENT (OUTPATIENT)
Dept: INTERNAL MEDICINE | Facility: CLINIC | Age: 50
End: 2020-10-02
Payer: COMMERCIAL

## 2020-10-02 VITALS — SYSTOLIC BLOOD PRESSURE: 122 MMHG | DIASTOLIC BLOOD PRESSURE: 72 MMHG

## 2020-10-02 DIAGNOSIS — E83.39 OTHER DISORDERS OF PHOSPHORUS METABOLISM: ICD-10-CM

## 2020-10-02 LAB
25(OH)D3 SERPL-MCNC: 31.9 NG/ML
ALBUMIN SERPL ELPH-MCNC: 4.7 G/DL
ALP BLD-CCNC: 51 U/L
ALT SERPL-CCNC: 9 U/L
ANION GAP SERPL CALC-SCNC: 14 MMOL/L
AST SERPL-CCNC: 12 U/L
BASOPHILS # BLD AUTO: 0.03 K/UL
BASOPHILS NFR BLD AUTO: 0.4 %
BILIRUB SERPL-MCNC: 0.7 MG/DL
BUN SERPL-MCNC: 13 MG/DL
CALCIUM SERPL-MCNC: 9.5 MG/DL
CHLORIDE SERPL-SCNC: 103 MMOL/L
CHOLEST SERPL-MCNC: 194 MG/DL
CHOLEST/HDLC SERPL: 2.6 RATIO
CO2 SERPL-SCNC: 23 MMOL/L
CREAT SERPL-MCNC: 0.9 MG/DL
EOSINOPHIL # BLD AUTO: 0.06 K/UL
EOSINOPHIL NFR BLD AUTO: 0.8 %
FOLATE SERPL-MCNC: >20 NG/ML
GLUCOSE SERPL-MCNC: 92 MG/DL
HCT VFR BLD CALC: 41.7 %
HDLC SERPL-MCNC: 74 MG/DL
HGB BLD-MCNC: 14.1 G/DL
IMM GRANULOCYTES NFR BLD AUTO: 0.3 %
LDLC SERPL CALC-MCNC: 98 MG/DL
LYMPHOCYTES # BLD AUTO: 2.1 K/UL
LYMPHOCYTES NFR BLD AUTO: 28.9 %
MAGNESIUM SERPL-MCNC: 2 MG/DL
MAN DIFF?: NORMAL
MCHC RBC-ENTMCNC: 30.6 PG
MCHC RBC-ENTMCNC: 33.8 GM/DL
MCV RBC AUTO: 90.5 FL
MONOCYTES # BLD AUTO: 0.28 K/UL
MONOCYTES NFR BLD AUTO: 3.9 %
NEUTROPHILS # BLD AUTO: 4.77 K/UL
NEUTROPHILS NFR BLD AUTO: 65.7 %
PLATELET # BLD AUTO: 288 K/UL
POTASSIUM SERPL-SCNC: 3.8 MMOL/L
PROT SERPL-MCNC: 6.8 G/DL
RBC # BLD: 4.61 M/UL
RBC # FLD: 12.8 %
SODIUM SERPL-SCNC: 140 MMOL/L
T4 FREE SERPL-MCNC: 1.2 NG/DL
T4 SERPL-MCNC: 5.4 UG/DL
TRIGL SERPL-MCNC: 107 MG/DL
TSH SERPL-ACNC: 1.4 UIU/ML
VIT B12 SERPL-MCNC: 413 PG/ML
WBC # FLD AUTO: 7.26 K/UL

## 2020-10-02 PROCEDURE — 36415 COLL VENOUS BLD VENIPUNCTURE: CPT

## 2020-10-02 PROCEDURE — 99214 OFFICE O/P EST MOD 30 MIN: CPT | Mod: 25

## 2020-10-02 NOTE — HISTORY OF PRESENT ILLNESS
[de-identified] : Pt presents s/p ER visit for BPV. \par CT Head / CT Angio was normal.\par Given meclizine/ reglan/ zofran in ED\par Phos was low - given replacement\par Feeling better, but still with "foggy" feeling.

## 2020-10-02 NOTE — ASSESSMENT
[FreeTextEntry1] : Blood work was drawn and sent to the lab today. The patient has been instructed to call the office next week to discuss today's lab work.\par \par Allegra/ Clariten PRN\par Zofran PRN\par \par Vestibular rehab\par \par Neurology eval for ocular migraines/ vertigo\par \par F/U end of month to reassess cymbalta

## 2020-10-03 LAB
ALBUMIN SERPL ELPH-MCNC: 4.7 G/DL
ALP BLD-CCNC: 55 U/L
ALT SERPL-CCNC: 10 U/L
ANION GAP SERPL CALC-SCNC: 11 MMOL/L
AST SERPL-CCNC: 12 U/L
BILIRUB SERPL-MCNC: 0.6 MG/DL
BUN SERPL-MCNC: 12 MG/DL
CALCIUM SERPL-MCNC: 9.3 MG/DL
CHLORIDE SERPL-SCNC: 102 MMOL/L
CO2 SERPL-SCNC: 25 MMOL/L
CREAT SERPL-MCNC: 0.86 MG/DL
GLUCOSE SERPL-MCNC: 90 MG/DL
PHOSPHATE SERPL-MCNC: 2.9 MG/DL
POTASSIUM SERPL-SCNC: 4.7 MMOL/L
PROT SERPL-MCNC: 6.7 G/DL
SODIUM SERPL-SCNC: 138 MMOL/L

## 2020-10-29 ENCOUNTER — APPOINTMENT (OUTPATIENT)
Dept: INTERNAL MEDICINE | Facility: CLINIC | Age: 50
End: 2020-10-29

## 2020-12-16 ENCOUNTER — RX RENEWAL (OUTPATIENT)
Age: 50
End: 2020-12-16

## 2021-03-13 ENCOUNTER — TRANSCRIPTION ENCOUNTER (OUTPATIENT)
Age: 51
End: 2021-03-13

## 2021-03-22 ENCOUNTER — RX RENEWAL (OUTPATIENT)
Age: 51
End: 2021-03-22

## 2021-05-03 ENCOUNTER — APPOINTMENT (OUTPATIENT)
Dept: INTERNAL MEDICINE | Facility: CLINIC | Age: 51
End: 2021-05-03
Payer: COMMERCIAL

## 2021-05-03 VITALS — TEMPERATURE: 97.9 F | HEIGHT: 64.5 IN | WEIGHT: 141 LBS | BODY MASS INDEX: 23.78 KG/M2

## 2021-05-03 VITALS — SYSTOLIC BLOOD PRESSURE: 118 MMHG | HEART RATE: 72 BPM | DIASTOLIC BLOOD PRESSURE: 70 MMHG | RESPIRATION RATE: 14 BRPM

## 2021-05-03 PROCEDURE — 99213 OFFICE O/P EST LOW 20 MIN: CPT

## 2021-05-03 PROCEDURE — 99072 ADDL SUPL MATRL&STAF TM PHE: CPT

## 2021-05-03 NOTE — ASSESSMENT
[FreeTextEntry1] : Continue duloxetine 40mg daily\par \par RN xanax\par istop checked\par \par F/U 3-4 months

## 2021-05-03 NOTE — HISTORY OF PRESENT ILLNESS
[de-identified] : Pt presents for evaluation- duloxetine had been working very well for her anxiety until about three weeks ago - anxiety began creeping back and getting worse.\par She increased duloxetine to 40mg on her own.\par Feels as if it has helped.\par

## 2021-05-04 DIAGNOSIS — Z23 ENCOUNTER FOR IMMUNIZATION: ICD-10-CM

## 2021-05-05 ENCOUNTER — TRANSCRIPTION ENCOUNTER (OUTPATIENT)
Age: 51
End: 2021-05-05

## 2021-06-23 ENCOUNTER — RX RENEWAL (OUTPATIENT)
Age: 51
End: 2021-06-23

## 2021-07-30 ENCOUNTER — TRANSCRIPTION ENCOUNTER (OUTPATIENT)
Age: 51
End: 2021-07-30

## 2021-08-02 ENCOUNTER — TRANSCRIPTION ENCOUNTER (OUTPATIENT)
Age: 51
End: 2021-08-02

## 2021-09-07 ENCOUNTER — RX RENEWAL (OUTPATIENT)
Age: 51
End: 2021-09-07

## 2021-09-08 ENCOUNTER — RX RENEWAL (OUTPATIENT)
Age: 51
End: 2021-09-08

## 2021-10-22 ENCOUNTER — RX RENEWAL (OUTPATIENT)
Age: 51
End: 2021-10-22

## 2021-11-15 ENCOUNTER — RX RENEWAL (OUTPATIENT)
Age: 51
End: 2021-11-15

## 2022-01-12 ENCOUNTER — APPOINTMENT (OUTPATIENT)
Dept: INTERNAL MEDICINE | Facility: CLINIC | Age: 52
End: 2022-01-12
Payer: COMMERCIAL

## 2022-01-12 ENCOUNTER — NON-APPOINTMENT (OUTPATIENT)
Age: 52
End: 2022-01-12

## 2022-01-12 ENCOUNTER — LABORATORY RESULT (OUTPATIENT)
Age: 52
End: 2022-01-12

## 2022-01-12 VITALS — RESPIRATION RATE: 14 BRPM | SYSTOLIC BLOOD PRESSURE: 120 MMHG | HEART RATE: 72 BPM | DIASTOLIC BLOOD PRESSURE: 72 MMHG

## 2022-01-12 VITALS — WEIGHT: 155 LBS | BODY MASS INDEX: 26.14 KG/M2 | HEIGHT: 64.5 IN

## 2022-01-12 DIAGNOSIS — Z23 ENCOUNTER FOR IMMUNIZATION: ICD-10-CM

## 2022-01-12 PROCEDURE — G0008: CPT

## 2022-01-12 PROCEDURE — 90686 IIV4 VACC NO PRSV 0.5 ML IM: CPT

## 2022-01-12 PROCEDURE — G0444 DEPRESSION SCREEN ANNUAL: CPT | Mod: NC,59

## 2022-01-12 PROCEDURE — 99213 OFFICE O/P EST LOW 20 MIN: CPT | Mod: 25

## 2022-01-12 PROCEDURE — 36415 COLL VENOUS BLD VENIPUNCTURE: CPT

## 2022-01-12 PROCEDURE — 93000 ELECTROCARDIOGRAM COMPLETE: CPT | Mod: 59

## 2022-01-12 PROCEDURE — 99396 PREV VISIT EST AGE 40-64: CPT | Mod: 25

## 2022-01-12 RX ORDER — DULOXETINE HYDROCHLORIDE 20 MG/1
20 CAPSULE, DELAYED RELEASE PELLETS ORAL
Qty: 180 | Refills: 0 | Status: DISCONTINUED | COMMUNITY
Start: 2020-09-09 | End: 2022-01-12

## 2022-01-12 RX ORDER — ALBUTEROL SULFATE 90 UG/1
108 (90 BASE) INHALANT RESPIRATORY (INHALATION) EVERY 4 HOURS
Qty: 1 | Refills: 0 | Status: DISCONTINUED | COMMUNITY
Start: 2020-09-09 | End: 2022-01-12

## 2022-01-12 RX ORDER — ONDANSETRON 4 MG/1
4 TABLET ORAL EVERY 8 HOURS
Qty: 9 | Refills: 0 | Status: DISCONTINUED | COMMUNITY
Start: 2020-10-02 | End: 2022-01-12

## 2022-01-12 NOTE — HISTORY OF PRESENT ILLNESS
[FreeTextEntry1] : HAMLET DAVIS is a 51 year old female  presents for an annual physical. Patient is also here for f/u of chronic medical conditions, which have been stable on medications. These include anxiety.\par  [de-identified] : Having worsening anxiety over past few weeks.\par Wants to go up on cymbalta.\par Not sleeping as well.

## 2022-01-12 NOTE — PHYSICAL EXAM
[Well Nourished] : well nourished [Well Developed] : well developed [Well-Appearing] : well-appearing [Normal Sclera/Conjunctiva] : normal sclera/conjunctiva [PERRL] : pupils equal round and reactive to light [EOMI] : extraocular movements intact [Normal Outer Ear/Nose] : the outer ears and nose were normal in appearance [Normal Oropharynx] : the oropharynx was normal [No JVD] : no jugular venous distention [No Lymphadenopathy] : no lymphadenopathy [Supple] : supple [Thyroid Normal, No Nodules] : the thyroid was normal and there were no nodules present [No Respiratory Distress] : no respiratory distress  [No Accessory Muscle Use] : no accessory muscle use [Clear to Auscultation] : lungs were clear to auscultation bilaterally [Normal Rate] : normal rate  [Regular Rhythm] : with a regular rhythm [Normal S1, S2] : normal S1 and S2 [No Murmur] : no murmur heard [No Carotid Bruits] : no carotid bruits [No Abdominal Bruit] : a ~M bruit was not heard ~T in the abdomen [No Varicosities] : no varicosities [Pedal Pulses Present] : the pedal pulses are present [No Edema] : there was no peripheral edema [No Palpable Aorta] : no palpable aorta [No Extremity Clubbing/Cyanosis] : no extremity clubbing/cyanosis [Normal Appearance] : normal in appearance [No Nipple Discharge] : no nipple discharge [No Axillary Lymphadenopathy] : no axillary lymphadenopathy [Soft] : abdomen soft [Non Tender] : non-tender [Non-distended] : non-distended [No Masses] : no abdominal mass palpated [No HSM] : no HSM [Normal Bowel Sounds] : normal bowel sounds [Normal Posterior Cervical Nodes] : no posterior cervical lymphadenopathy [Normal Anterior Cervical Nodes] : no anterior cervical lymphadenopathy [No CVA Tenderness] : no CVA  tenderness [No Spinal Tenderness] : no spinal tenderness [No Joint Swelling] : no joint swelling [Grossly Normal Strength/Tone] : grossly normal strength/tone [No Rash] : no rash [Coordination Grossly Intact] : coordination grossly intact [No Focal Deficits] : no focal deficits [Normal Gait] : normal gait [Normal Affect] : the affect was normal [Normal Insight/Judgement] : insight and judgment were intact [de-identified] : s/p b/l reduction [de-identified] : EDWARD Navarro last pap 2/21 neg [de-identified] : Derm Dr Montiel  1/2022

## 2022-01-12 NOTE — HEALTH RISK ASSESSMENT
[Very Good] : ~his/her~  mood as very good [No] : In the past 12 months have you used drugs other than those required for medical reasons? No [No falls in past year] : Patient reported no falls in the past year [0] : 2) Feeling down, depressed, or hopeless: Not at all (0) [Patient reported mammogram was normal] : Patient reported mammogram was normal [Patient reported PAP Smear was normal] : Patient reported PAP Smear was normal [HIV test declined] : HIV test declined [Hepatitis C test declined] : Hepatitis C test declined [With Significant Other] : lives with significant other [Employed] : employed [] :  [# Of Children ___] : has [unfilled] children [Sexually Active] : sexually active [Fully functional (bathing, dressing, toileting, transferring, walking, feeding)] : Fully functional (bathing, dressing, toileting, transferring, walking, feeding) [Fully functional (using the telephone, shopping, preparing meals, housekeeping, doing laundry, using] : Fully functional and needs no help or supervision to perform IADLs (using the telephone, shopping, preparing meals, housekeeping, doing laundry, using transportation, managing medications and managing finances) [PHQ-2 Negative - No further assessment needed] : PHQ-2 Negative - No further assessment needed [de-identified] : yoga [[id number]] : 0 [Reports changes in hearing] : Reports no changes in hearing [Reports changes in vision] : Reports no changes in vision [Reports changes in dental health] : Reports no changes in dental health [MammogramDate] : 09/19 [PapSmearDate] : 02/20 [de-identified] : last eye exam within 12 months

## 2022-01-12 NOTE — ASSESSMENT
[FreeTextEntry1] : Blood work was drawn and sent to the lab today. The patient has been instructed to call the office next week to discuss today's lab work.\par \par Increase Cymbalta 60mg daily\par Televideo evaluatino 6-8 weeks\par \par \par Routine OBGYN / Mammogram/ Breast sono\par Bone Density due\par \par Colonoscopy due - names given\par \par Cardiology evaluation for abnormal EKG - pt asymptomatic\par \par Flu vaccine given\par Encouraged COVID booster\par \par Routine health counselling provided\par Annual CC\par

## 2022-01-13 ENCOUNTER — APPOINTMENT (OUTPATIENT)
Dept: CARDIOLOGY | Facility: CLINIC | Age: 52
End: 2022-01-13
Payer: COMMERCIAL

## 2022-01-13 ENCOUNTER — NON-APPOINTMENT (OUTPATIENT)
Age: 52
End: 2022-01-13

## 2022-01-13 VITALS
HEART RATE: 71 BPM | WEIGHT: 159 LBS | DIASTOLIC BLOOD PRESSURE: 80 MMHG | OXYGEN SATURATION: 100 % | SYSTOLIC BLOOD PRESSURE: 108 MMHG | BODY MASS INDEX: 26.87 KG/M2

## 2022-01-13 DIAGNOSIS — R06.02 SHORTNESS OF BREATH: ICD-10-CM

## 2022-01-13 LAB
25(OH)D3 SERPL-MCNC: 22.5 NG/ML
ALBUMIN SERPL ELPH-MCNC: 4.6 G/DL
ALP BLD-CCNC: 71 U/L
ALT SERPL-CCNC: 8 U/L
ANION GAP SERPL CALC-SCNC: 14 MMOL/L
APPEARANCE: ABNORMAL
AST SERPL-CCNC: 14 U/L
BASOPHILS # BLD AUTO: 0.03 K/UL
BASOPHILS NFR BLD AUTO: 0.6 %
BILIRUB SERPL-MCNC: 0.5 MG/DL
BILIRUBIN URINE: NEGATIVE
BLOOD URINE: NORMAL
BUN SERPL-MCNC: 18 MG/DL
CALCIUM SERPL-MCNC: 9.8 MG/DL
CHLORIDE SERPL-SCNC: 106 MMOL/L
CHOLEST SERPL-MCNC: 227 MG/DL
CO2 SERPL-SCNC: 24 MMOL/L
COLOR: YELLOW
CREAT SERPL-MCNC: 0.91 MG/DL
EOSINOPHIL # BLD AUTO: 0.13 K/UL
EOSINOPHIL NFR BLD AUTO: 2.5 %
FOLATE SERPL-MCNC: 13.4 NG/ML
GLUCOSE QUALITATIVE U: NEGATIVE
GLUCOSE SERPL-MCNC: 81 MG/DL
HCT VFR BLD CALC: 45.5 %
HDLC SERPL-MCNC: 68 MG/DL
HGB BLD-MCNC: 14.8 G/DL
IMM GRANULOCYTES NFR BLD AUTO: 0.2 %
KETONES URINE: NEGATIVE
LDLC SERPL CALC-MCNC: 138 MG/DL
LEUKOCYTE ESTERASE URINE: NEGATIVE
LYMPHOCYTES # BLD AUTO: 1.61 K/UL
LYMPHOCYTES NFR BLD AUTO: 31.3 %
MAGNESIUM SERPL-MCNC: 2.1 MG/DL
MAN DIFF?: NORMAL
MCHC RBC-ENTMCNC: 30 PG
MCHC RBC-ENTMCNC: 32.5 GM/DL
MCV RBC AUTO: 92.3 FL
MONOCYTES # BLD AUTO: 0.26 K/UL
MONOCYTES NFR BLD AUTO: 5 %
NEUTROPHILS # BLD AUTO: 3.11 K/UL
NEUTROPHILS NFR BLD AUTO: 60.4 %
NITRITE URINE: NEGATIVE
NONHDLC SERPL-MCNC: 159 MG/DL
PH URINE: 5.5
PLATELET # BLD AUTO: 335 K/UL
POTASSIUM SERPL-SCNC: 4.4 MMOL/L
PROT SERPL-MCNC: 7 G/DL
PROTEIN URINE: NORMAL
RBC # BLD: 4.93 M/UL
RBC # FLD: 13.2 %
SODIUM SERPL-SCNC: 143 MMOL/L
SPECIFIC GRAVITY URINE: 1.03
T4 FREE SERPL-MCNC: 0.9 NG/DL
T4 SERPL-MCNC: 4.9 UG/DL
TRIGL SERPL-MCNC: 102 MG/DL
TSH SERPL-ACNC: 1.7 UIU/ML
UROBILINOGEN URINE: NORMAL
VIT B12 SERPL-MCNC: 300 PG/ML
WBC # FLD AUTO: 5.15 K/UL

## 2022-01-13 PROCEDURE — 93306 TTE W/DOPPLER COMPLETE: CPT

## 2022-01-13 PROCEDURE — 99204 OFFICE O/P NEW MOD 45 MIN: CPT

## 2022-01-13 PROCEDURE — 93000 ELECTROCARDIOGRAM COMPLETE: CPT

## 2022-01-13 NOTE — DISCUSSION/SUMMARY
[FreeTextEntry1] : She is 51-year-old with no active medical issues who presents with some exertional shortness of breath and an abnormal ECG.  Resting echocardiogram showed normal left ventricular systolic function with no significant valvular abnormalities.  We do not have a cardiac source for her abnormal ECG and I would suggest a noninvasive ischemic evaluation to exclude severe CAD as she is a former smoker.\par I raised the possibility that her abnormal ECG is related to her chest wall/breast surgery and, if no ischemia is found I would assume that this is the cause for the ECG abnormality.

## 2022-01-13 NOTE — REASON FOR VISIT
[FreeTextEntry1] : Dear Yasmani,\par Thank you for referring her for cardiovascular evaluation.  She is a 51-year-old with recent routine evaluation in your office and was noted to have an abnormal ECG.  She reports feeling well overall and is able to go about her usual activities without difficulty.  She does not exercise routinely of late though she had been performing daily yoga in the past.  She can walk 3 flights of stairs but becomes winded thereafter.\par She has a history of intermittent asthma with rare albuterol use.\par She also notes occasional fluttering in her left chest that lasts for few seconds and resolve spontaneously.  Aside from this she has occasional episodes of orthostatic lightheadedness and GERD.\par She has no history of coronary artery disease, diabetes mellitus, hypertension, hypercholesterolemia and is a former cigarette smoker and current vapor.\par Paternal grandfather MI in his 60's. Maternal grandfather CVA/MI in 70's.\par Active vaping for 4 years. Cigarette smoking in 20's- never a heavy smoker.\par Very rare ETOH. 1 x coffee a day.\par \par \par

## 2022-02-10 ENCOUNTER — APPOINTMENT (OUTPATIENT)
Dept: CARDIOLOGY | Facility: CLINIC | Age: 52
End: 2022-02-10
Payer: COMMERCIAL

## 2022-02-10 PROCEDURE — 93351 STRESS TTE COMPLETE: CPT

## 2022-03-02 ENCOUNTER — APPOINTMENT (OUTPATIENT)
Dept: INTERNAL MEDICINE | Facility: CLINIC | Age: 52
End: 2022-03-02
Payer: COMMERCIAL

## 2022-03-02 PROCEDURE — 99213 OFFICE O/P EST LOW 20 MIN: CPT | Mod: 95

## 2022-03-02 NOTE — HISTORY OF PRESENT ILLNESS
[Home] : at home, [unfilled] , at the time of the visit. [Medical Office: ([facility])___] : at the medical office located in  [Verbal consent obtained from patient] : the patient, [unfilled] [de-identified] : Pt presents for evaluation via televideo\par Pt had negative stress echo to  evaluate abnormal EKG\par She has been doing well on increased dose of duloxetine.\par No side effects and anxiety is under much better control.

## 2022-05-02 ENCOUNTER — APPOINTMENT (OUTPATIENT)
Dept: ULTRASOUND IMAGING | Facility: CLINIC | Age: 52
End: 2022-05-02
Payer: COMMERCIAL

## 2022-05-02 ENCOUNTER — APPOINTMENT (OUTPATIENT)
Dept: RADIOLOGY | Facility: CLINIC | Age: 52
End: 2022-05-02
Payer: COMMERCIAL

## 2022-05-02 ENCOUNTER — RESULT REVIEW (OUTPATIENT)
Age: 52
End: 2022-05-02

## 2022-05-02 ENCOUNTER — APPOINTMENT (OUTPATIENT)
Dept: MAMMOGRAPHY | Facility: CLINIC | Age: 52
End: 2022-05-02
Payer: COMMERCIAL

## 2022-05-02 ENCOUNTER — OUTPATIENT (OUTPATIENT)
Dept: OUTPATIENT SERVICES | Facility: HOSPITAL | Age: 52
LOS: 1 days | End: 2022-05-02
Payer: COMMERCIAL

## 2022-05-02 DIAGNOSIS — Z98.890 OTHER SPECIFIED POSTPROCEDURAL STATES: Chronic | ICD-10-CM

## 2022-05-02 DIAGNOSIS — Z00.00 ENCOUNTER FOR GENERAL ADULT MEDICAL EXAMINATION WITHOUT ABNORMAL FINDINGS: ICD-10-CM

## 2022-05-02 PROCEDURE — 77080 DXA BONE DENSITY AXIAL: CPT | Mod: 26

## 2022-05-02 PROCEDURE — 77063 BREAST TOMOSYNTHESIS BI: CPT | Mod: 26

## 2022-05-02 PROCEDURE — 77080 DXA BONE DENSITY AXIAL: CPT

## 2022-05-02 PROCEDURE — 77063 BREAST TOMOSYNTHESIS BI: CPT

## 2022-05-02 PROCEDURE — 77067 SCR MAMMO BI INCL CAD: CPT | Mod: 26

## 2022-05-02 PROCEDURE — 77067 SCR MAMMO BI INCL CAD: CPT

## 2022-05-02 PROCEDURE — 76641 ULTRASOUND BREAST COMPLETE: CPT

## 2022-05-02 PROCEDURE — 76641 ULTRASOUND BREAST COMPLETE: CPT | Mod: 26,50

## 2022-06-01 ENCOUNTER — NON-APPOINTMENT (OUTPATIENT)
Age: 52
End: 2022-06-01

## 2022-06-02 ENCOUNTER — APPOINTMENT (OUTPATIENT)
Dept: INTERNAL MEDICINE | Facility: CLINIC | Age: 52
End: 2022-06-02
Payer: COMMERCIAL

## 2022-06-02 VITALS
DIASTOLIC BLOOD PRESSURE: 80 MMHG | BODY MASS INDEX: 27.49 KG/M2 | HEIGHT: 64.5 IN | HEART RATE: 72 BPM | SYSTOLIC BLOOD PRESSURE: 118 MMHG | RESPIRATION RATE: 14 BRPM | WEIGHT: 163 LBS

## 2022-06-02 DIAGNOSIS — H81.10 BENIGN PAROXYSMAL VERTIGO, UNSPECIFIED EAR: ICD-10-CM

## 2022-06-02 PROCEDURE — 36415 COLL VENOUS BLD VENIPUNCTURE: CPT

## 2022-06-02 PROCEDURE — 99213 OFFICE O/P EST LOW 20 MIN: CPT | Mod: 25

## 2022-06-04 NOTE — HISTORY OF PRESENT ILLNESS
[FreeTextEntry8] : Pt presents for evaluation of vertigo symptoms worse with head rotation to right. \par No fever / chills\par No ear pain\par Symptoms last a few seconds \par Mild relief with meclizine.

## 2022-06-04 NOTE — ASSESSMENT
[FreeTextEntry1] : Blood work was drawn and sent to the lab today. The patient has been instructed to call the office next week to discuss today's lab work.\par \par Meclizine PRN\par \par Vestibular PT\par \par F/U if symptoms do not resolve, or if they should change/worsen.\par May need ENT evaluation

## 2022-07-16 ENCOUNTER — TRANSCRIPTION ENCOUNTER (OUTPATIENT)
Age: 52
End: 2022-07-16

## 2022-09-02 ENCOUNTER — RX RENEWAL (OUTPATIENT)
Age: 52
End: 2022-09-02

## 2022-11-03 ENCOUNTER — NON-APPOINTMENT (OUTPATIENT)
Age: 52
End: 2022-11-03

## 2022-12-06 ENCOUNTER — RX RENEWAL (OUTPATIENT)
Age: 52
End: 2022-12-06

## 2023-03-02 ENCOUNTER — RX RENEWAL (OUTPATIENT)
Age: 53
End: 2023-03-02

## 2023-03-17 ENCOUNTER — LABORATORY RESULT (OUTPATIENT)
Age: 53
End: 2023-03-17

## 2023-03-17 ENCOUNTER — APPOINTMENT (OUTPATIENT)
Dept: INTERNAL MEDICINE | Facility: CLINIC | Age: 53
End: 2023-03-17
Payer: COMMERCIAL

## 2023-03-17 ENCOUNTER — NON-APPOINTMENT (OUTPATIENT)
Age: 53
End: 2023-03-17

## 2023-03-17 VITALS — WEIGHT: 178 LBS | HEIGHT: 64.5 IN | BODY MASS INDEX: 30.02 KG/M2

## 2023-03-17 VITALS — RESPIRATION RATE: 14 BRPM | SYSTOLIC BLOOD PRESSURE: 122 MMHG | HEART RATE: 72 BPM | DIASTOLIC BLOOD PRESSURE: 80 MMHG

## 2023-03-17 DIAGNOSIS — Z86.19 PERSONAL HISTORY OF OTHER INFECTIOUS AND PARASITIC DISEASES: ICD-10-CM

## 2023-03-17 DIAGNOSIS — F41.9 ANXIETY DISORDER, UNSPECIFIED: ICD-10-CM

## 2023-03-17 DIAGNOSIS — R94.31 ABNORMAL ELECTROCARDIOGRAM [ECG] [EKG]: ICD-10-CM

## 2023-03-17 LAB
ALBUMIN SERPL ELPH-MCNC: 4.7 G/DL
ALP BLD-CCNC: 94 U/L
ALT SERPL-CCNC: 13 U/L
ANION GAP SERPL CALC-SCNC: 11 MMOL/L
AST SERPL-CCNC: 17 U/L
BASOPHILS # BLD AUTO: 0.03 K/UL
BASOPHILS NFR BLD AUTO: 0.4 %
BILIRUB SERPL-MCNC: 0.3 MG/DL
BUN SERPL-MCNC: 16 MG/DL
CALCIUM SERPL-MCNC: 9.5 MG/DL
CHLORIDE SERPL-SCNC: 104 MMOL/L
CO2 SERPL-SCNC: 24 MMOL/L
CREAT SERPL-MCNC: 0.86 MG/DL
EGFR: 82 ML/MIN/1.73M2
EOSINOPHIL # BLD AUTO: 0.24 K/UL
EOSINOPHIL NFR BLD AUTO: 3.5 %
GLUCOSE SERPL-MCNC: 131 MG/DL
HCT VFR BLD CALC: 42.1 %
HGB BLD-MCNC: 14.3 G/DL
IMM GRANULOCYTES NFR BLD AUTO: 0.3 %
LYMPHOCYTES # BLD AUTO: 2.03 K/UL
LYMPHOCYTES NFR BLD AUTO: 29.3 %
MAN DIFF?: NORMAL
MCHC RBC-ENTMCNC: 30.8 PG
MCHC RBC-ENTMCNC: 34 GM/DL
MCV RBC AUTO: 90.5 FL
MONOCYTES # BLD AUTO: 0.28 K/UL
MONOCYTES NFR BLD AUTO: 4 %
NEUTROPHILS # BLD AUTO: 4.34 K/UL
NEUTROPHILS NFR BLD AUTO: 62.5 %
PLATELET # BLD AUTO: 328 K/UL
POTASSIUM SERPL-SCNC: 3.8 MMOL/L
PROT SERPL-MCNC: 6.9 G/DL
RBC # BLD: 4.65 M/UL
RBC # FLD: 13.5 %
SODIUM SERPL-SCNC: 140 MMOL/L
WBC # FLD AUTO: 6.94 K/UL

## 2023-03-17 PROCEDURE — 36415 COLL VENOUS BLD VENIPUNCTURE: CPT

## 2023-03-17 PROCEDURE — 93000 ELECTROCARDIOGRAM COMPLETE: CPT | Mod: 59

## 2023-03-17 PROCEDURE — 99213 OFFICE O/P EST LOW 20 MIN: CPT | Mod: 25

## 2023-03-17 PROCEDURE — 99396 PREV VISIT EST AGE 40-64: CPT | Mod: 25

## 2023-03-20 ENCOUNTER — NON-APPOINTMENT (OUTPATIENT)
Age: 53
End: 2023-03-20

## 2023-03-23 ENCOUNTER — NON-APPOINTMENT (OUTPATIENT)
Age: 53
End: 2023-03-23

## 2023-03-23 LAB
25(OH)D3 SERPL-MCNC: 18.4 NG/ML
ALBUMIN SERPL ELPH-MCNC: 4.4 G/DL
ALP BLD-CCNC: 81 U/L
ALT SERPL-CCNC: 11 U/L
ANION GAP SERPL CALC-SCNC: 11 MMOL/L
APPEARANCE: CLEAR
AST SERPL-CCNC: 13 U/L
BASOPHILS # BLD AUTO: 0.03 K/UL
BASOPHILS NFR BLD AUTO: 0.5 %
BILIRUB SERPL-MCNC: 0.2 MG/DL
BILIRUBIN URINE: NEGATIVE
BLOOD URINE: NEGATIVE
BUN SERPL-MCNC: 17 MG/DL
CALCIUM SERPL-MCNC: 9.7 MG/DL
CHLORIDE SERPL-SCNC: 105 MMOL/L
CHOLEST SERPL-MCNC: 219 MG/DL
CO2 SERPL-SCNC: 23 MMOL/L
COLOR: YELLOW
CREAT SERPL-MCNC: 0.8 MG/DL
EGFR: 89 ML/MIN/1.73M2
EOSINOPHIL # BLD AUTO: 0.14 K/UL
EOSINOPHIL NFR BLD AUTO: 2.3 %
ESTIMATED AVERAGE GLUCOSE: 105 MG/DL
FOLATE SERPL-MCNC: 15.1 NG/ML
GLUCOSE QUALITATIVE U: NEGATIVE
GLUCOSE SERPL-MCNC: 109 MG/DL
HBA1C MFR BLD HPLC: 5.3 %
HCT VFR BLD CALC: 40.8 %
HDLC SERPL-MCNC: 54 MG/DL
HGB BLD-MCNC: 13.7 G/DL
IMM GRANULOCYTES NFR BLD AUTO: 0.3 %
KETONES URINE: NEGATIVE
LDLC SERPL CALC-MCNC: 121 MG/DL
LEUKOCYTE ESTERASE URINE: NEGATIVE
LYMPHOCYTES # BLD AUTO: 1.68 K/UL
LYMPHOCYTES NFR BLD AUTO: 27.1 %
MAGNESIUM SERPL-MCNC: 1.8 MG/DL
MAN DIFF?: NORMAL
MCHC RBC-ENTMCNC: 30 PG
MCHC RBC-ENTMCNC: 33.6 GM/DL
MCV RBC AUTO: 89.3 FL
MONOCYTES # BLD AUTO: 0.33 K/UL
MONOCYTES NFR BLD AUTO: 5.3 %
NEUTROPHILS # BLD AUTO: 3.99 K/UL
NEUTROPHILS NFR BLD AUTO: 64.5 %
NITRITE URINE: NEGATIVE
NONHDLC SERPL-MCNC: 165 MG/DL
PH URINE: 6
PLATELET # BLD AUTO: 303 K/UL
POTASSIUM SERPL-SCNC: 4 MMOL/L
PROT SERPL-MCNC: 6.4 G/DL
PROTEIN URINE: ABNORMAL
RBC # BLD: 4.57 M/UL
RBC # FLD: 13.2 %
SODIUM SERPL-SCNC: 139 MMOL/L
SPECIFIC GRAVITY URINE: 1.03
T4 FREE SERPL-MCNC: 0.9 NG/DL
T4 SERPL-MCNC: 4.4 UG/DL
TRIGL SERPL-MCNC: 216 MG/DL
TSH SERPL-ACNC: 1.45 UIU/ML
UROBILINOGEN URINE: NORMAL
VIT B12 SERPL-MCNC: 279 PG/ML
WBC # FLD AUTO: 6.19 K/UL

## 2023-03-26 NOTE — HEALTH RISK ASSESSMENT
[Very Good] : ~his/her~  mood as very good [No] : In the past 12 months have you used drugs other than those required for medical reasons? No [No falls in past year] : Patient reported no falls in the past year [0] : 2) Feeling down, depressed, or hopeless: Not at all (0) [PHQ-2 Negative - No further assessment needed] : PHQ-2 Negative - No further assessment needed [Patient reported mammogram was normal] : Patient reported mammogram was normal [Patient reported PAP Smear was normal] : Patient reported PAP Smear was normal [HIV test declined] : HIV test declined [Hepatitis C test declined] : Hepatitis C test declined [With Significant Other] : lives with significant other [Employed] : employed [] :  [# Of Children ___] : has [unfilled] children [Sexually Active] : sexually active [Fully functional (bathing, dressing, toileting, transferring, walking, feeding)] : Fully functional (bathing, dressing, toileting, transferring, walking, feeding) [Fully functional (using the telephone, shopping, preparing meals, housekeeping, doing laundry, using] : Fully functional and needs no help or supervision to perform IADLs (using the telephone, shopping, preparing meals, housekeeping, doing laundry, using transportation, managing medications and managing finances) [de-identified] : yoga [[id number]] : 0 [Reports changes in hearing] : Reports no changes in hearing [Reports changes in vision] : Reports no changes in vision [Reports changes in dental health] : Reports no changes in dental health [PapSmearDate] : 02/20 [MammogramDate] : 09/19 [de-identified] : last eye exam within 12 months [Never] : Never

## 2023-03-26 NOTE — HISTORY OF PRESENT ILLNESS
[FreeTextEntry1] : HAMLET DAVIS is a 52 year old female  presents for an annual physical. Patient is also here for f/u of chronic medical conditions, which have been stable on medications. These include anxiety.\par  [de-identified] : Doing well on duloxetine\par does not wish to stop

## 2023-03-26 NOTE — PHYSICAL EXAM
[Well Nourished] : well nourished [Well Developed] : well developed [Well-Appearing] : well-appearing [Normal Sclera/Conjunctiva] : normal sclera/conjunctiva [PERRL] : pupils equal round and reactive to light [EOMI] : extraocular movements intact [Normal Outer Ear/Nose] : the outer ears and nose were normal in appearance [Normal Oropharynx] : the oropharynx was normal [No JVD] : no jugular venous distention [No Lymphadenopathy] : no lymphadenopathy [Supple] : supple [Thyroid Normal, No Nodules] : the thyroid was normal and there were no nodules present [No Respiratory Distress] : no respiratory distress  [No Accessory Muscle Use] : no accessory muscle use [Clear to Auscultation] : lungs were clear to auscultation bilaterally [Normal Rate] : normal rate  [Regular Rhythm] : with a regular rhythm [Normal S1, S2] : normal S1 and S2 [No Murmur] : no murmur heard [No Carotid Bruits] : no carotid bruits [No Abdominal Bruit] : a ~M bruit was not heard ~T in the abdomen [No Varicosities] : no varicosities [Pedal Pulses Present] : the pedal pulses are present [No Edema] : there was no peripheral edema [No Palpable Aorta] : no palpable aorta [No Extremity Clubbing/Cyanosis] : no extremity clubbing/cyanosis [Normal Appearance] : normal in appearance [No Nipple Discharge] : no nipple discharge [No Axillary Lymphadenopathy] : no axillary lymphadenopathy [Soft] : abdomen soft [Non Tender] : non-tender [Non-distended] : non-distended [No Masses] : no abdominal mass palpated [No HSM] : no HSM [Normal Bowel Sounds] : normal bowel sounds [No CVA Tenderness] : no CVA  tenderness [No Spinal Tenderness] : no spinal tenderness [No Joint Swelling] : no joint swelling [Grossly Normal Strength/Tone] : grossly normal strength/tone [No Rash] : no rash [Coordination Grossly Intact] : coordination grossly intact [No Focal Deficits] : no focal deficits [Normal Gait] : normal gait [Normal Affect] : the affect was normal [Normal Insight/Judgement] : insight and judgment were intact [de-identified] : s/p b/l reduction [de-identified] : EDWARD Navarro last pap 2/21 neg [de-identified] : Derm Dr Montiel  1/2022

## 2023-03-26 NOTE — ASSESSMENT
[FreeTextEntry1] : Blood work was drawn and sent to the lab today. The patient has been instructed to call the office next week to discuss today's lab work.\par \par Continue Cymbalta 60mg daily\par \par Routine OBGYN / Mammogram/ Breast sono\par Bone Density due\par \par Colonoscopy due - names given\par \par Encouraged COVID booster\par \par Routine health counselling provided\par Annual CC\par

## 2023-05-24 ENCOUNTER — RESULT REVIEW (OUTPATIENT)
Age: 53
End: 2023-05-24

## 2023-05-24 ENCOUNTER — APPOINTMENT (OUTPATIENT)
Dept: ULTRASOUND IMAGING | Facility: CLINIC | Age: 53
End: 2023-05-24
Payer: COMMERCIAL

## 2023-05-24 ENCOUNTER — NON-APPOINTMENT (OUTPATIENT)
Age: 53
End: 2023-05-24

## 2023-05-24 ENCOUNTER — APPOINTMENT (OUTPATIENT)
Dept: MAMMOGRAPHY | Facility: CLINIC | Age: 53
End: 2023-05-24
Payer: COMMERCIAL

## 2023-05-24 ENCOUNTER — OUTPATIENT (OUTPATIENT)
Dept: OUTPATIENT SERVICES | Facility: HOSPITAL | Age: 53
LOS: 1 days | End: 2023-05-24
Payer: COMMERCIAL

## 2023-05-24 DIAGNOSIS — Z00.8 ENCOUNTER FOR OTHER GENERAL EXAMINATION: ICD-10-CM

## 2023-05-24 DIAGNOSIS — Z00.00 ENCOUNTER FOR GENERAL ADULT MEDICAL EXAMINATION WITHOUT ABNORMAL FINDINGS: ICD-10-CM

## 2023-05-24 DIAGNOSIS — Z98.890 OTHER SPECIFIED POSTPROCEDURAL STATES: Chronic | ICD-10-CM

## 2023-05-24 PROCEDURE — 77063 BREAST TOMOSYNTHESIS BI: CPT

## 2023-05-24 PROCEDURE — 77063 BREAST TOMOSYNTHESIS BI: CPT | Mod: 26

## 2023-05-24 PROCEDURE — 77067 SCR MAMMO BI INCL CAD: CPT | Mod: 26

## 2023-05-24 PROCEDURE — 76641 ULTRASOUND BREAST COMPLETE: CPT

## 2023-05-24 PROCEDURE — 76641 ULTRASOUND BREAST COMPLETE: CPT | Mod: 26,50

## 2023-05-24 PROCEDURE — 77067 SCR MAMMO BI INCL CAD: CPT

## 2023-05-26 ENCOUNTER — APPOINTMENT (OUTPATIENT)
Dept: OBGYN | Facility: CLINIC | Age: 53
End: 2023-05-26
Payer: COMMERCIAL

## 2023-05-26 VITALS
WEIGHT: 178 LBS | BODY MASS INDEX: 30.02 KG/M2 | SYSTOLIC BLOOD PRESSURE: 116 MMHG | HEIGHT: 64.5 IN | DIASTOLIC BLOOD PRESSURE: 74 MMHG

## 2023-05-26 DIAGNOSIS — Z12.4 ENCOUNTER FOR SCREENING FOR MALIGNANT NEOPLASM OF CERVIX: ICD-10-CM

## 2023-05-26 DIAGNOSIS — Z12.39 ENCOUNTER FOR OTHER SCREENING FOR MALIGNANT NEOPLASM OF BREAST: ICD-10-CM

## 2023-05-26 PROCEDURE — 99386 PREV VISIT NEW AGE 40-64: CPT

## 2023-05-26 PROCEDURE — 82270 OCCULT BLOOD FECES: CPT

## 2023-05-26 RX ORDER — MECLIZINE HYDROCHLORIDE 25 MG/1
25 TABLET ORAL 3 TIMES DAILY
Qty: 30 | Refills: 1 | Status: COMPLETED | COMMUNITY
Start: 2022-06-02 | End: 2023-05-26

## 2023-05-26 NOTE — HISTORY OF PRESENT ILLNESS
[FreeTextEntry1] : 51 yo  with LMP 2018 following ablation for new gyn visit\par \par Menstrual triad: 15 x 25-35 x 7-10\par \par CC: hot flashes, nigh sweats, wt gain\par \par OB:\par  M 8.6#  [place]\par  F 8.4# \par \par GYN:\par  surgical Ab - no complications\par \par  Ablation - with total amenorrhea\par \par FH:\par Mother - ALS, hysterectomy for menorrhaghia\par MGM - hyst in early 30s, Pancreatic ca\par PGM - Pancreatic ca\par Father - HIV complications,  \par \par SH: \par , children, \par  [Mammogramdate] : 2023

## 2023-05-26 NOTE — DISCUSSION/SUMMARY
[FreeTextEntry1] : Health Maintenance:\par -Pap with HPV\par -Mammo Rx\par -TBSE\par -colonoscopy guidelines reviewed with patient. Referral to Colorectal. \par -Achieve Vit D levels of 30-40, intake of 1100 mg daily calcium mostly thru dark green\par leafy greens and milk products, exercise 30 minutes TIW\par \par \par \par

## 2023-05-26 NOTE — PHYSICAL EXAM
[Appropriately responsive] : appropriately responsive [Alert] : alert [No Acute Distress] : no acute distress [No Lymphadenopathy] : no lymphadenopathy [Soft] : soft [Non-tender] : non-tender [Non-distended] : non-distended [No HSM] : No HSM [No Lesions] : no lesions [No Mass] : no mass [Oriented x3] : oriented x3 [Examination Of The Breasts] : a normal appearance [No Masses] : no breast masses were palpable [Labia Majora] : normal [Labia Minora] : normal [Normal] : normal [Uterine Adnexae] : normal [FreeTextEntry9] : Pedro negative

## 2023-05-28 LAB — HPV HIGH+LOW RISK DNA PNL CVX: NOT DETECTED

## 2023-05-31 LAB — CYTOLOGY CVX/VAG DOC THIN PREP: NORMAL

## 2023-06-01 ENCOUNTER — RX RENEWAL (OUTPATIENT)
Age: 53
End: 2023-06-01

## 2023-06-08 ENCOUNTER — NON-APPOINTMENT (OUTPATIENT)
Age: 53
End: 2023-06-08

## 2023-09-04 ENCOUNTER — RX RENEWAL (OUTPATIENT)
Age: 53
End: 2023-09-04

## 2023-10-24 NOTE — CONSULT NOTE ADULT - SUBJECTIVE AND OBJECTIVE BOX
HPI:  Patient is a 49 yo F with pmhx of cymbalta who presented to the ED and neurology was consulted for concern of dizziness. Patient reports that she had sudden onset dizziness and nausea at work around 1400 9/29/20. She reports associated diaphoresis and inability to keep her eyes open due to the room spinning sensation. She states that she had to lay on the floor for a couple hours but symptoms persisted. She denies hx of similar symptoms or stroke. Patient reports HA over her R eye which resolved with tylenol. She states that the dizziness was exacerbated by sitting up and moving her head around. She reports a brief episode of b/l hand n/t which resolved on its own. 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 She has not seen a physician for these as they happen a few times a year and states that they are usually remitted by taking claritin. Patient currently reports significant improvement of her symptoms. Currently denies headache, blurry/double vision, lightheadedness, tinnitus, nausea, cp, sob, n/t, weakness.    ROS: A 10-system ROS was performed and is negative except for those items noted above and/or in the HPI.    PAST MEDICAL & SURGICAL HISTORY:  Anxiety    History of bilateral breast reduction surgery      FAMILY HISTORY:  No pertinent family history in first degree relatives        SOCIAL HISTORY: SOCIAL HISTORY:     Marital Status: (  )   (  ) Single  (  )   (  )      Occupation:      Lives: (  ) alone  (  ) with children   (  ) with spouse  (  ) with parents  (  ) other     Illicit Drug Use: (  ) never used  (  ) other _____     Tobacco Use:  (  ) never smoked  (  ) former smoker  (  ) current smoker  (  ) pack year  (  ) last cigarette date     Alcohol Use:      Sexual History:        MEDICATIONS  Home Medications:      MEDICATIONS  (STANDING):    MEDICATIONS  (PRN):      ALLERGIES/INTOLERANCES:  Allergies  No Known Allergies    Intolerances      OBJECTIVE:  VITALS   Vital Signs Last 24 Hrs  T(C): 36.7 (29 Sep 2020 23:30), Max: 36.7 (29 Sep 2020 15:52)  T(F): 98 (29 Sep 2020 23:30), Max: 98 (29 Sep 2020 15:52)  HR: 59 (29 Sep 2020 23:30) (59 - 84)  BP: 108/67 (29 Sep 2020 23:30) (106/67 - 124/74)  BP(mean): 81 (29 Sep 2020 23:01) (81 - 85)  RR: 16 (29 Sep 2020 23:30) (14 - 18)  SpO2: 98% (29 Sep 2020 23:30) (98% - 100%)    PHYSICAL EXAM:    General:  Constitutional: Obese Female, appears stated age, in no apparent distress including pain  Head: Normocephalic & atraumatic.  ENT: Patent ear canals, intact TM, mucus membranes moist & pink, neck supple, no lymphadenopathy.     Neurological (>12):  MS: Awake, alert, oriented to person, place, situation, time. Normal affect. Follows all commands.    Language: Speech is clear, fluent with good repetition & comprehension (able to name objects: pen, watch, glasses)    CNs: PERRLA (R = 7mm, L = 7mm). VFF. EOMI no nystagmus, no diplopia. V1-3 intact to LT/pinprick, well developed masseter muscles b/l. No facial asymmetry b/l, full eye closure strength b/l. Hearing grossly normal (rubbing fingers) b/l. Symmetric palate elevation in midline. Gag reflex deferred. Head turning & shoulder shrug intact b/l. Tongue midline, normal movements, no atrophy.    HINTS: Normal head impulse, no nystagmus, negative test of skew    Motor: Normal muscle bulk & tone. No noticeable tremor. No pronator drift.              Deltoid	Biceps	Triceps	   R	5	5	5	5		  L	5	5	5	5	    	H-Flex	K-Flex	K-Ext	D-Flex	P-Flex  R	5	5	5	5	5	 	   L	5	5	5	5	5		     Sensation: Intact to LT b/l throughout.     Cortical: Extinction on DSS (neglect): none    Reflexes:              Biceps(C5)       BR(C6)     Triceps(C7)               Patellar(L4)    Achilles(S1)    Plantar Resp  R	2	          2	             2		        2		    2		Down   L	2	          2	             2		        2		    2		Down     Coordination: No dysmetria to FTN/HTS    Gait: Normal Romberg. No postural instability. Normal stance and tandem gait.     LABORATORY:  CBC                       14.0   11.18 )-----------( 259      ( 29 Sep 2020 16:30 )             41.4     Chem 09-29    137  |  104  |  18  ----------------------------<  154<H>  3.6   |  15<L>  |  0.82    Ca    9.2      29 Sep 2020 16:29  Phos  1.0     09-29  Mg     1.8     09-29    TPro  6.9  /  Alb  4.6  /  TBili  0.5  /  DBili  x   /  AST  15  /  ALT  7<L>  /  AlkPhos  53  09-29    LFTs LIVER FUNCTIONS - ( 29 Sep 2020 16:29 )  Alb: 4.6 g/dL / Pro: 6.9 g/dL / ALK PHOS: 53 U/L / ALT: 7 U/L / AST: 15 U/L / GGT: x              PAST MEDICAL HISTORY:  Back pain     Bladder stones     History of BPH     Left knee pain

## 2023-12-04 NOTE — ED ADULT TRIAGE NOTE - SOURCE OF INFORMATION
Colorectal & General Surgery  Follow - Up    Patient: Chucho Jorgensen  YOB: 1977  MRN: [id number]      Assessment  Chucho Jorgensen is a 45 y.o. male with perianal drainage and need for screening colonoscopy as he turns 46 in 3 days.  I suspect that he has either a perianal abscess or a perianal fistula given his symptoms of drainage.  In office anoscopy was negative for any obvious pathology, but there was small amounts of purulent exudate in the posterior position.  Would benefit from examination under anesthesia for better visualization.  We will plan to proceed with screening colonoscopy and examination under anesthesia with possible incision and drainage of perianal abscess.  We discussed the risk, benefits, alternatives, including the risk of perforation and recurrence of drainage.    Chief Complaint: Rectal drainage    History of Present Illness   Chucho Jorgensen is a 45 y.o. male who presents to the office at request of my partner Dr. Forrest Cortez to discuss perianal drainage and his need for screening colonoscopy.  He states that he has daily drainage and he describes it as a thin yellow liquid with small amount of purulent exudate.  He denies pain with bowel movements.  He has 1-2 soft bowel movements per day.  Denies hematochezia except for 1 time when he was riding a roller coaster with his son.  Otherwise, no complaints today.    Most recent colonoscopy: Never    Past Medical History   Past Medical History:   Diagnosis Date    Allergies     Fissure, anal 10/01/23    Rectal bleeding 2022    Issues with hemorrhoid        Past Surgical History   Past Surgical History:   Procedure Laterality Date    SHOULDER SURGERY Right        Social History  Social History     Socioeconomic History    Marital status:    Tobacco Use    Smoking status: Former     Types: Cigarettes    Smokeless tobacco: Never   Vaping Use    Vaping Use: Never used   Substance and Sexual Activity    Alcohol use: Not  Currently    Drug use: Defer    Sexual activity: Defer       Family History  Family History   Problem Relation Age of Onset    No Known Problems Mother     Hyperlipidemia Father     Hypertension Father        Colorectal cancer family history: None    Review of Systems  Negative except as documented in the HPI.     Allergies  No Known Allergies    Medications    Current Outpatient Medications:     rosuvastatin (CRESTOR) 10 MG tablet, TAKE 1 TABLET BY MOUTH DAILY, Disp: 90 tablet, Rfl: 4    Vital Signs  Vitals:    12/04/23 1006   BP: 120/84        Physical Exam  Constitutional: Resting comfortably, no acute distress  Neck: Supple, trachea midline  Respiratory: No increased work of breathing, Symmetric excursion  Cardiovascular: Well pefursed, no jugular venous distention evident   Abdominal:  Soft, non-tender, non-distended  Lymphatics: No cervical or suprascapular adenopathy  Skin: Warm, dry, no rash on visualized skin surfaces  Musculoskeletal: Symmetric strength, no obvious gross abnormalities  Psychiatric: Alert and oriented ×3, normal affect   Perianal: Small skin tag in the left lateral position, normal anoderm  Digital rectal exam: Normal tone, no masses    DIAGNOSTIC ANOSCOPY    Indication: Rectal drainage risks, benefits and alternatives were discussed and the patient agreed to the procedure.      Procedure Note: With a lubricated, gloved index finger, I gently performed a 360 degree sweep of the rectum checking for anal sphincter tone, tenderness, nodules, and masses. Following gentle dilation with a digital rectal exam, I inserted the lubricated anoscope with the obturator completely inserted. The obturator was removed after fully inserting the anoscope. The anoscope was slowly withdrawn, and the rectal and anal mucosa examined over 360 degrees.     Findings: No obvious abscess or fistula.  Grade 1 internal hemorrhoids.  Small amount of yellow exudate.   Complications: None  Patient tolerated the procedure  well.     Laboratory Results  I have personally reviewed laboratory results from October 20, 2023 demonstrate CMP with creatinine 0.99, AST 27, ALT 37, total bilirubin 0.4, albumin 5.3.  Hemoglobin A1c 5.7.    Radiology  None to review    Endoscopy  None to review         Khang Lilly MD  Colorectal & General Surgery  [facility]    [address], [address]  [place], [place], [address]  P: [phone]  F: [phone]     Patient/EMS

## 2023-12-22 ENCOUNTER — RX RENEWAL (OUTPATIENT)
Age: 53
End: 2023-12-22

## 2024-03-25 ENCOUNTER — RX RENEWAL (OUTPATIENT)
Age: 54
End: 2024-03-25

## 2024-05-18 ENCOUNTER — APPOINTMENT (OUTPATIENT)
Dept: INTERNAL MEDICINE | Facility: CLINIC | Age: 54
End: 2024-05-18
Payer: COMMERCIAL

## 2024-05-18 ENCOUNTER — NON-APPOINTMENT (OUTPATIENT)
Age: 54
End: 2024-05-18

## 2024-05-18 VITALS — WEIGHT: 176 LBS | HEIGHT: 65 IN | BODY MASS INDEX: 29.32 KG/M2

## 2024-05-18 DIAGNOSIS — Z00.00 ENCOUNTER FOR GENERAL ADULT MEDICAL EXAMINATION W/OUT ABNORMAL FINDINGS: ICD-10-CM

## 2024-05-18 DIAGNOSIS — R53.83 OTHER MALAISE: ICD-10-CM

## 2024-05-18 DIAGNOSIS — R53.81 OTHER MALAISE: ICD-10-CM

## 2024-05-18 DIAGNOSIS — R92.30 DENSE BREASTS, UNSPECIFIED: ICD-10-CM

## 2024-05-18 PROCEDURE — 93000 ELECTROCARDIOGRAM COMPLETE: CPT

## 2024-05-18 PROCEDURE — 99396 PREV VISIT EST AGE 40-64: CPT

## 2024-05-18 PROCEDURE — 36415 COLL VENOUS BLD VENIPUNCTURE: CPT

## 2024-05-18 PROCEDURE — 99213 OFFICE O/P EST LOW 20 MIN: CPT | Mod: 25

## 2024-05-18 RX ORDER — ALPRAZOLAM 0.5 MG/1
0.5 TABLET ORAL
Qty: 15 | Refills: 0 | Status: ACTIVE | COMMUNITY
Start: 2020-09-09 | End: 1900-01-01

## 2024-05-18 RX ORDER — ALBUTEROL SULFATE 90 UG/1
108 (90 BASE) AEROSOL, METERED RESPIRATORY (INHALATION)
Qty: 1 | Refills: 0 | Status: ACTIVE | COMMUNITY
Start: 2024-05-18 | End: 1900-01-01

## 2024-05-30 LAB
25(OH)D3 SERPL-MCNC: 18.5 NG/ML
ALBUMIN SERPL ELPH-MCNC: 4.4 G/DL
ALP BLD-CCNC: 66 U/L
ALT SERPL-CCNC: 10 U/L
ANION GAP SERPL CALC-SCNC: 14 MMOL/L
APPEARANCE: CLEAR
AST SERPL-CCNC: 14 U/L
BASOPHILS # BLD AUTO: 0.04 K/UL
BASOPHILS NFR BLD AUTO: 0.7 %
BILIRUB SERPL-MCNC: 0.5 MG/DL
BILIRUBIN URINE: NEGATIVE
BLOOD URINE: NEGATIVE
BUN SERPL-MCNC: 20 MG/DL
CALCIUM SERPL-MCNC: 9.2 MG/DL
CHLORIDE SERPL-SCNC: 105 MMOL/L
CHOLEST SERPL-MCNC: 244 MG/DL
CO2 SERPL-SCNC: 20 MMOL/L
COLOR: YELLOW
CREAT SERPL-MCNC: 0.96 MG/DL
EGFR: 71 ML/MIN/1.73M2
EOSINOPHIL # BLD AUTO: 0.15 K/UL
EOSINOPHIL NFR BLD AUTO: 2.8 %
ESTIMATED AVERAGE GLUCOSE: 100 MG/DL
FOLATE SERPL-MCNC: 10.6 NG/ML
GLUCOSE QUALITATIVE U: NEGATIVE MG/DL
GLUCOSE SERPL-MCNC: 89 MG/DL
HBA1C MFR BLD HPLC: 5.1 %
HCT VFR BLD CALC: 41.2 %
HDLC SERPL-MCNC: 76 MG/DL
HGB BLD-MCNC: 14.1 G/DL
IMM GRANULOCYTES NFR BLD AUTO: 0.2 %
KETONES URINE: NEGATIVE MG/DL
LDLC SERPL CALC-MCNC: 144 MG/DL
LEUKOCYTE ESTERASE URINE: NEGATIVE
LYMPHOCYTES # BLD AUTO: 1.65 K/UL
LYMPHOCYTES NFR BLD AUTO: 30.7 %
MAGNESIUM SERPL-MCNC: 2 MG/DL
MAN DIFF?: NORMAL
MCHC RBC-ENTMCNC: 30.2 PG
MCHC RBC-ENTMCNC: 34.2 GM/DL
MCV RBC AUTO: 88.2 FL
MONOCYTES # BLD AUTO: 0.25 K/UL
MONOCYTES NFR BLD AUTO: 4.6 %
NEUTROPHILS # BLD AUTO: 3.28 K/UL
NEUTROPHILS NFR BLD AUTO: 61 %
NITRITE URINE: NEGATIVE
NONHDLC SERPL-MCNC: 167 MG/DL
PH URINE: 6
PLATELET # BLD AUTO: 287 K/UL
POTASSIUM SERPL-SCNC: 4.4 MMOL/L
PROT SERPL-MCNC: 6.6 G/DL
PROTEIN URINE: NEGATIVE MG/DL
RBC # BLD: 4.67 M/UL
RBC # FLD: 14.3 %
SODIUM SERPL-SCNC: 139 MMOL/L
SPECIFIC GRAVITY URINE: >1.03
T4 FREE SERPL-MCNC: 0.8 NG/DL
T4 SERPL-MCNC: 5.2 UG/DL
TRIGL SERPL-MCNC: 131 MG/DL
TSH SERPL-ACNC: 1.92 UIU/ML
UROBILINOGEN URINE: 0.2 MG/DL
VIT B12 SERPL-MCNC: 388 PG/ML
WBC # FLD AUTO: 5.38 K/UL

## 2024-05-31 NOTE — PHYSICAL EXAM
[Well Nourished] : well nourished [Well Developed] : well developed [Well-Appearing] : well-appearing [Normal Sclera/Conjunctiva] : normal sclera/conjunctiva [PERRL] : pupils equal round and reactive to light [EOMI] : extraocular movements intact [Normal Outer Ear/Nose] : the outer ears and nose were normal in appearance [Normal Oropharynx] : the oropharynx was normal [No JVD] : no jugular venous distention [No Lymphadenopathy] : no lymphadenopathy [Supple] : supple [Thyroid Normal, No Nodules] : the thyroid was normal and there were no nodules present [No Respiratory Distress] : no respiratory distress  [No Accessory Muscle Use] : no accessory muscle use [Clear to Auscultation] : lungs were clear to auscultation bilaterally [Normal Rate] : normal rate  [Regular Rhythm] : with a regular rhythm [Normal S1, S2] : normal S1 and S2 [No Murmur] : no murmur heard [No Carotid Bruits] : no carotid bruits [No Abdominal Bruit] : a ~M bruit was not heard ~T in the abdomen [No Varicosities] : no varicosities [Pedal Pulses Present] : the pedal pulses are present [No Edema] : there was no peripheral edema [No Palpable Aorta] : no palpable aorta [No Extremity Clubbing/Cyanosis] : no extremity clubbing/cyanosis [Normal Appearance] : normal in appearance [No Nipple Discharge] : no nipple discharge [No Axillary Lymphadenopathy] : no axillary lymphadenopathy [Soft] : abdomen soft [Non Tender] : non-tender [Non-distended] : non-distended [No Masses] : no abdominal mass palpated [No HSM] : no HSM [Normal Bowel Sounds] : normal bowel sounds [No CVA Tenderness] : no CVA  tenderness [No Spinal Tenderness] : no spinal tenderness [No Joint Swelling] : no joint swelling [Grossly Normal Strength/Tone] : grossly normal strength/tone [No Rash] : no rash [Coordination Grossly Intact] : coordination grossly intact [No Focal Deficits] : no focal deficits [Normal Gait] : normal gait [Normal Affect] : the affect was normal [Normal Insight/Judgement] : insight and judgment were intact [de-identified] : s/p b/l reduction [de-identified] : EDWARD Navarro last pap 2/21 neg [de-identified] : Derm Dr Montiel  1/2022

## 2024-05-31 NOTE — HEALTH RISK ASSESSMENT
[Very Good] : ~his/her~  mood as very good [No] : In the past 12 months have you used drugs other than those required for medical reasons? No [No falls in past year] : Patient reported no falls in the past year [0] : 2) Feeling down, depressed, or hopeless: Not at all (0) [PHQ-2 Negative - No further assessment needed] : PHQ-2 Negative - No further assessment needed [de-identified] : yoga [[id number]] : 0 [Never] : Never [Patient reported mammogram was normal] : Patient reported mammogram was normal [Patient reported PAP Smear was normal] : Patient reported PAP Smear was normal [HIV test declined] : HIV test declined [Hepatitis C test declined] : Hepatitis C test declined [With Significant Other] : lives with significant other [Employed] : employed [] :  [# Of Children ___] : has [unfilled] children [Sexually Active] : sexually active [Fully functional (bathing, dressing, toileting, transferring, walking, feeding)] : Fully functional (bathing, dressing, toileting, transferring, walking, feeding) [Fully functional (using the telephone, shopping, preparing meals, housekeeping, doing laundry, using] : Fully functional and needs no help or supervision to perform IADLs (using the telephone, shopping, preparing meals, housekeeping, doing laundry, using transportation, managing medications and managing finances) [Reports changes in hearing] : Reports no changes in hearing [Reports changes in vision] : Reports no changes in vision [Reports changes in dental health] : Reports no changes in dental health [MammogramDate] : 09/19 [PapSmearDate] : 02/20 [de-identified] : last eye exam within 12 months

## 2024-05-31 NOTE — HISTORY OF PRESENT ILLNESS
[FreeTextEntry1] : HAMLET DAVIS is a 52 year old female  presents for an annual physical. Patient is also here for f/u of chronic medical conditions, which have been stable on medications. These include anxiety.\par   [de-identified] : Doing well on duloxetine\par  does not wish to stop

## 2024-06-21 ENCOUNTER — APPOINTMENT (OUTPATIENT)
Dept: ULTRASOUND IMAGING | Facility: CLINIC | Age: 54
End: 2024-06-21
Payer: COMMERCIAL

## 2024-06-21 ENCOUNTER — RESULT REVIEW (OUTPATIENT)
Age: 54
End: 2024-06-21

## 2024-06-21 ENCOUNTER — OUTPATIENT (OUTPATIENT)
Dept: OUTPATIENT SERVICES | Facility: HOSPITAL | Age: 54
LOS: 1 days | End: 2024-06-21
Payer: COMMERCIAL

## 2024-06-21 ENCOUNTER — APPOINTMENT (OUTPATIENT)
Dept: MAMMOGRAPHY | Facility: CLINIC | Age: 54
End: 2024-06-21
Payer: COMMERCIAL

## 2024-06-21 DIAGNOSIS — Z98.890 OTHER SPECIFIED POSTPROCEDURAL STATES: Chronic | ICD-10-CM

## 2024-06-21 DIAGNOSIS — Z12.39 ENCOUNTER FOR OTHER SCREENING FOR MALIGNANT NEOPLASM OF BREAST: ICD-10-CM

## 2024-06-21 DIAGNOSIS — R92.30 DENSE BREASTS, UNSPECIFIED: ICD-10-CM

## 2024-06-21 PROCEDURE — 76641 ULTRASOUND BREAST COMPLETE: CPT

## 2024-06-21 PROCEDURE — 77067 SCR MAMMO BI INCL CAD: CPT | Mod: 26

## 2024-06-21 PROCEDURE — 76641 ULTRASOUND BREAST COMPLETE: CPT | Mod: 26,50

## 2024-06-21 PROCEDURE — 77067 SCR MAMMO BI INCL CAD: CPT

## 2024-06-21 PROCEDURE — 77063 BREAST TOMOSYNTHESIS BI: CPT | Mod: 26

## 2024-06-21 PROCEDURE — 77063 BREAST TOMOSYNTHESIS BI: CPT

## 2024-06-24 ENCOUNTER — RX RENEWAL (OUTPATIENT)
Age: 54
End: 2024-06-24

## 2024-06-24 RX ORDER — DULOXETINE HYDROCHLORIDE 60 MG/1
60 CAPSULE, DELAYED RELEASE PELLETS ORAL
Qty: 90 | Refills: 0 | Status: ACTIVE | COMMUNITY
Start: 2022-01-12 | End: 1900-01-01

## 2024-08-28 ENCOUNTER — APPOINTMENT (OUTPATIENT)
Dept: GASTROENTEROLOGY | Facility: CLINIC | Age: 54
End: 2024-08-28
Payer: COMMERCIAL

## 2024-08-28 VITALS
HEIGHT: 64 IN | SYSTOLIC BLOOD PRESSURE: 98 MMHG | BODY MASS INDEX: 29.88 KG/M2 | WEIGHT: 175 LBS | DIASTOLIC BLOOD PRESSURE: 70 MMHG

## 2024-08-28 DIAGNOSIS — J45.909 UNSPECIFIED ASTHMA, UNCOMPLICATED: ICD-10-CM

## 2024-08-28 DIAGNOSIS — F41.9 ANXIETY DISORDER, UNSPECIFIED: ICD-10-CM

## 2024-08-28 DIAGNOSIS — Z12.12 ENCOUNTER FOR SCREENING FOR MALIGNANT NEOPLASM OF COLON: ICD-10-CM

## 2024-08-28 DIAGNOSIS — Z12.11 ENCOUNTER FOR SCREENING FOR MALIGNANT NEOPLASM OF COLON: ICD-10-CM

## 2024-08-28 PROCEDURE — 99203 OFFICE O/P NEW LOW 30 MIN: CPT

## 2024-08-28 RX ORDER — SODIUM SULFATE, POTASSIUM SULFATE AND MAGNESIUM SULFATE 1.6; 3.13; 17.5 G/177ML; G/177ML; G/177ML
17.5-3.13-1.6 SOLUTION ORAL
Qty: 1 | Refills: 0 | Status: ACTIVE | COMMUNITY
Start: 2024-08-28 | End: 1900-01-01

## 2024-08-28 NOTE — ASSESSMENT
[FreeTextEntry1] : IMPRESSION: #  Due for a screening test for colorectal cancer -  Never colonoscopy. -  Patient denies family history of colon polyp and gastrointestinal cancers.   PLAN:  We discussed colorectal cancer in the [place].   Colorectal cancer is the 3rd most common cause of cancer for men and woman, 2nd most common cause of cancer related deaths in the [place].  By 2030, colorectal cancer will be the leading cause of cancer related death in age 20 to 49.   We reviewed risk factors for colon cancer which include age, personal history of colon polyps, having a family Hx of colon cancer, cancer syndromes, personal Hx of inflammatory disease, having comorbidities such as obesity, having nicotine addiction, alcohol addiction,etc.   Screening for colorectal cancer begins at age 45 for average risk individuals (and earlier for high risk groups).   This patient is AVERAGE RISK for colorectal cancer (patient without a history of advanced adenomas or 3 or more adenomas, colorectal cancer, family history of colorectal cancer, inflammatory bowel disease, and known genetic predisposition to colorectal cancer).     We reviewed the screening tests for colorectal cancer detection and prevention.  We discussed screening tests such as stool based strategies, direct visualization techniques such as Virtual (CT) colonography, flexible sigmoidoscopy, and colonoscopy.    Patient was made aware that despite these screening test, a lesion can still be missed.    Certain precancerous lesions such as sessile serrated lesions are more difficult to detect on screening exams than the conventional, precancerous lesion, adenomas.  Colonoscopy offers the best chance at detecting sessile serrated lesions.  Stool DNA tests detect only a fraction of large sessile serrated lesions, FIT detect sessile serrated lesions poorly because sessile serrated lesions rarely bleed even if large.  CT colonography does not detect sessile serrated lesions due to its flat morphology.  Sigmoidoscopy does not examine the proximal colon where most of sessile serrated lesions reside.  I have advised the patient to arrange for a colonoscopy to rule out colon polyps, colorectal cancer etc. under monitored anesthesia care.  Risks such as perforation requiring surgery, colostomy, bleeding requiring blood transfusion, infection/sepsis, diverticulitis, colitis, missed colon cancer (2% to 6%), internal organ injury such as splenic hemorrhage (1/6000, risk thin, female gender) etc, adverse reaction to medication etc. and risks of anesthesia including cardiopulmonary compromise requiring ICU care were discussed with patient.  Alternatives were discussed.  Patient verbalized understanding and agrees to proceed with a colonoscopy under anesthesia.

## 2024-08-28 NOTE — PHYSICAL EXAM
[Bowel Sounds] : normal bowel sounds [Abdomen Tenderness] : non-tender [No Masses] : no abdominal mass palpated [Abdomen Soft] : soft [Cervical Lymph Nodes Enlarged Posterior Bilaterally] : no posterior cervical lymphadenopathy [Supraclavicular Lymph Nodes Enlarged Bilaterally] : no supraclavicular lymphadenopathy [Abnormal Walk] : normal gait [No Clubbing, Cyanosis] : no clubbing or cyanosis of the fingernails [] : no rash [Normal] : oriented to person, place, and time

## 2024-08-28 NOTE — HISTORY OF PRESENT ILLNESS
[FreeTextEntry1] : 53 y/o mother of two, who presents for a screening colonoscopy.   Never colonoscopy.   Patient denies family history of colon polyp and gastrointestinal cancers. Grandmother had pancreatitis - no cancer.   Years of irregular bowel movement.   Patient denies having abdominal pain, loss of appetite, weight loss, melena and hematochezia.  Patient denies having heartburn, regurgitation, difficulty swallowing, painful swallowing, nausea, vomiting.  All other review of systems are negative.  Denies cardiac symptoms.

## 2024-09-19 ENCOUNTER — RX RENEWAL (OUTPATIENT)
Age: 54
End: 2024-09-19

## 2024-09-27 ENCOUNTER — NON-APPOINTMENT (OUTPATIENT)
Age: 54
End: 2024-09-27

## 2024-10-29 ENCOUNTER — APPOINTMENT (OUTPATIENT)
Dept: OBGYN | Facility: CLINIC | Age: 54
End: 2024-10-29
Payer: COMMERCIAL

## 2024-10-29 ENCOUNTER — RESULT REVIEW (OUTPATIENT)
Age: 54
End: 2024-10-29

## 2024-10-29 VITALS
SYSTOLIC BLOOD PRESSURE: 114 MMHG | BODY MASS INDEX: 29.88 KG/M2 | HEIGHT: 64 IN | DIASTOLIC BLOOD PRESSURE: 72 MMHG | WEIGHT: 175 LBS

## 2024-10-29 DIAGNOSIS — Z12.4 ENCOUNTER FOR SCREENING FOR MALIGNANT NEOPLASM OF CERVIX: ICD-10-CM

## 2024-10-29 DIAGNOSIS — N63.10 UNSPECIFIED LUMP IN THE RIGHT BREAST, UNSPECIFIED QUADRANT: ICD-10-CM

## 2024-10-29 DIAGNOSIS — Z12.39 ENCOUNTER FOR OTHER SCREENING FOR MALIGNANT NEOPLASM OF BREAST: ICD-10-CM

## 2024-10-29 PROCEDURE — 99396 PREV VISIT EST AGE 40-64: CPT

## 2024-10-30 ENCOUNTER — OUTPATIENT (OUTPATIENT)
Dept: OUTPATIENT SERVICES | Facility: HOSPITAL | Age: 54
LOS: 1 days | End: 2024-10-30
Payer: COMMERCIAL

## 2024-10-30 ENCOUNTER — APPOINTMENT (OUTPATIENT)
Dept: ULTRASOUND IMAGING | Facility: CLINIC | Age: 54
End: 2024-10-30
Payer: COMMERCIAL

## 2024-10-30 ENCOUNTER — RESULT REVIEW (OUTPATIENT)
Age: 54
End: 2024-10-30

## 2024-10-30 DIAGNOSIS — Z00.8 ENCOUNTER FOR OTHER GENERAL EXAMINATION: ICD-10-CM

## 2024-10-30 DIAGNOSIS — Z98.890 OTHER SPECIFIED POSTPROCEDURAL STATES: Chronic | ICD-10-CM

## 2024-10-30 DIAGNOSIS — N63.10 UNSPECIFIED LUMP IN THE RIGHT BREAST, UNSPECIFIED QUADRANT: ICD-10-CM

## 2024-10-30 PROCEDURE — 76642 ULTRASOUND BREAST LIMITED: CPT | Mod: 26,RT

## 2024-10-30 PROCEDURE — 76642 ULTRASOUND BREAST LIMITED: CPT

## 2024-12-06 ENCOUNTER — RESULT REVIEW (OUTPATIENT)
Age: 54
End: 2024-12-06

## 2024-12-06 ENCOUNTER — OUTPATIENT (OUTPATIENT)
Dept: OUTPATIENT SERVICES | Facility: HOSPITAL | Age: 54
LOS: 1 days | End: 2024-12-06
Payer: COMMERCIAL

## 2024-12-06 ENCOUNTER — APPOINTMENT (OUTPATIENT)
Dept: GASTROENTEROLOGY | Facility: HOSPITAL | Age: 54
End: 2024-12-06

## 2024-12-06 DIAGNOSIS — Z12.11 ENCOUNTER FOR SCREENING FOR MALIGNANT NEOPLASM OF COLON: ICD-10-CM

## 2024-12-06 DIAGNOSIS — Z98.890 OTHER SPECIFIED POSTPROCEDURAL STATES: Chronic | ICD-10-CM

## 2024-12-06 DIAGNOSIS — Z12.12 ENCOUNTER FOR SCREENING FOR MALIGNANT NEOPLASM OF RECTUM: ICD-10-CM

## 2024-12-06 PROCEDURE — 88305 TISSUE EXAM BY PATHOLOGIST: CPT | Mod: 26

## 2024-12-06 PROCEDURE — 88305 TISSUE EXAM BY PATHOLOGIST: CPT

## 2024-12-06 PROCEDURE — 45385 COLONOSCOPY W/LESION REMOVAL: CPT | Mod: PT

## 2024-12-06 PROCEDURE — 45385 COLONOSCOPY W/LESION REMOVAL: CPT

## 2024-12-09 LAB — SURGICAL PATHOLOGY STUDY: SIGNIFICANT CHANGE UP

## 2024-12-17 ENCOUNTER — RX RENEWAL (OUTPATIENT)
Age: 54
End: 2024-12-17

## 2024-12-19 ENCOUNTER — NON-APPOINTMENT (OUTPATIENT)
Age: 54
End: 2024-12-19

## 2024-12-23 ENCOUNTER — NON-APPOINTMENT (OUTPATIENT)
Age: 54
End: 2024-12-23

## 2024-12-27 ENCOUNTER — NON-APPOINTMENT (OUTPATIENT)
Age: 54
End: 2024-12-27

## 2025-01-17 ENCOUNTER — RX RENEWAL (OUTPATIENT)
Age: 55
End: 2025-01-17

## 2025-05-28 ENCOUNTER — NON-APPOINTMENT (OUTPATIENT)
Age: 55
End: 2025-05-28

## 2025-05-29 ENCOUNTER — APPOINTMENT (OUTPATIENT)
Dept: INTERNAL MEDICINE | Facility: CLINIC | Age: 55
End: 2025-05-29

## 2025-05-30 ENCOUNTER — APPOINTMENT (OUTPATIENT)
Dept: INTERNAL MEDICINE | Facility: CLINIC | Age: 55
End: 2025-05-30
Payer: COMMERCIAL

## 2025-05-30 ENCOUNTER — NON-APPOINTMENT (OUTPATIENT)
Age: 55
End: 2025-05-30

## 2025-05-30 VITALS
SYSTOLIC BLOOD PRESSURE: 112 MMHG | OXYGEN SATURATION: 99 % | HEART RATE: 81 BPM | TEMPERATURE: 97.6 F | BODY MASS INDEX: 30.38 KG/M2 | WEIGHT: 177 LBS | DIASTOLIC BLOOD PRESSURE: 78 MMHG

## 2025-05-30 DIAGNOSIS — Z00.00 ENCOUNTER FOR GENERAL ADULT MEDICAL EXAMINATION W/OUT ABNORMAL FINDINGS: ICD-10-CM

## 2025-05-30 DIAGNOSIS — E66.9 OVERWEIGHT: ICD-10-CM

## 2025-05-30 DIAGNOSIS — F41.9 ANXIETY DISORDER, UNSPECIFIED: ICD-10-CM

## 2025-05-30 DIAGNOSIS — F32.A ANXIETY DISORDER, UNSPECIFIED: ICD-10-CM

## 2025-05-30 DIAGNOSIS — E66.3 OVERWEIGHT: ICD-10-CM

## 2025-05-30 PROCEDURE — 93000 ELECTROCARDIOGRAM COMPLETE: CPT

## 2025-05-30 PROCEDURE — 99396 PREV VISIT EST AGE 40-64: CPT

## 2025-05-30 PROCEDURE — 99212 OFFICE O/P EST SF 10 MIN: CPT | Mod: 25

## 2025-05-31 PROBLEM — E66.3 OVERWEIGHT AND OBESITY: Status: ACTIVE | Noted: 2025-05-31

## 2025-05-31 PROBLEM — Z00.00 ANNUAL PHYSICAL EXAM: Status: ACTIVE | Noted: 2025-05-30

## 2025-05-31 PROBLEM — F41.9 ANXIETY AND DEPRESSION: Status: ACTIVE | Noted: 2025-05-31

## 2025-05-31 LAB
ALBUMIN SERPL ELPH-MCNC: 4.7 G/DL
ALP BLD-CCNC: 82 U/L
ALT SERPL-CCNC: 14 U/L
ANION GAP SERPL CALC-SCNC: 14 MMOL/L
APPEARANCE: CLEAR
AST SERPL-CCNC: 18 U/L
BASOPHILS # BLD AUTO: 0.02 K/UL
BASOPHILS NFR BLD AUTO: 0.4 %
BILIRUB SERPL-MCNC: 0.5 MG/DL
BILIRUBIN URINE: NEGATIVE
BLOOD URINE: NEGATIVE
BUN SERPL-MCNC: 13 MG/DL
CALCIUM SERPL-MCNC: 9.6 MG/DL
CHLORIDE SERPL-SCNC: 101 MMOL/L
CHOLEST SERPL-MCNC: 276 MG/DL
CO2 SERPL-SCNC: 25 MMOL/L
COLOR: YELLOW
CREAT SERPL-MCNC: 0.91 MG/DL
EGFRCR SERPLBLD CKD-EPI 2021: 75 ML/MIN/1.73M2
EOSINOPHIL # BLD AUTO: 0.18 K/UL
EOSINOPHIL NFR BLD AUTO: 3.3 %
ESTIMATED AVERAGE GLUCOSE: 105 MG/DL
GLUCOSE QUALITATIVE U: NEGATIVE MG/DL
GLUCOSE SERPL-MCNC: 84 MG/DL
HBA1C MFR BLD HPLC: 5.3 %
HCT VFR BLD CALC: 44.8 %
HDLC SERPL-MCNC: 64 MG/DL
HGB BLD-MCNC: 14.6 G/DL
IMM GRANULOCYTES NFR BLD AUTO: 0.2 %
KETONES URINE: NEGATIVE MG/DL
LDLC SERPL-MCNC: 170 MG/DL
LEUKOCYTE ESTERASE URINE: NEGATIVE
LYMPHOCYTES # BLD AUTO: 1.81 K/UL
LYMPHOCYTES NFR BLD AUTO: 32.8 %
MAN DIFF?: NORMAL
MCHC RBC-ENTMCNC: 29.9 PG
MCHC RBC-ENTMCNC: 32.6 G/DL
MCV RBC AUTO: 91.6 FL
MONOCYTES # BLD AUTO: 0.29 K/UL
MONOCYTES NFR BLD AUTO: 5.3 %
NEUTROPHILS # BLD AUTO: 3.21 K/UL
NEUTROPHILS NFR BLD AUTO: 58 %
NITRITE URINE: NEGATIVE
NONHDLC SERPL-MCNC: 211 MG/DL
PH URINE: 6.5
PLATELET # BLD AUTO: 308 K/UL
POTASSIUM SERPL-SCNC: 4.4 MMOL/L
PROT SERPL-MCNC: 6.9 G/DL
PROTEIN URINE: NEGATIVE MG/DL
RBC # BLD: 4.89 M/UL
RBC # FLD: 13.5 %
SODIUM SERPL-SCNC: 140 MMOL/L
SPECIFIC GRAVITY URINE: 1.02
TRIGL SERPL-MCNC: 221 MG/DL
TSH SERPL-ACNC: 1.94 UIU/ML
UROBILINOGEN URINE: 0.2 MG/DL
WBC # FLD AUTO: 5.52 K/UL

## 2025-06-10 RX ORDER — TIRZEPATIDE 2.5 MG/.5ML
2.5 INJECTION, SOLUTION SUBCUTANEOUS
Qty: 1 | Refills: 0 | Status: ACTIVE | COMMUNITY
Start: 2025-06-10 | End: 1900-01-01

## 2025-06-30 ENCOUNTER — APPOINTMENT (OUTPATIENT)
Dept: INTERNAL MEDICINE | Facility: CLINIC | Age: 55
End: 2025-06-30

## 2025-07-21 ENCOUNTER — APPOINTMENT (OUTPATIENT)
Dept: INTERNAL MEDICINE | Facility: CLINIC | Age: 55
End: 2025-07-21
Payer: COMMERCIAL

## 2025-07-21 VITALS
OXYGEN SATURATION: 98 % | WEIGHT: 168 LBS | SYSTOLIC BLOOD PRESSURE: 110 MMHG | BODY MASS INDEX: 28.84 KG/M2 | HEART RATE: 88 BPM | TEMPERATURE: 97.3 F | DIASTOLIC BLOOD PRESSURE: 74 MMHG

## 2025-07-21 DIAGNOSIS — R92.30 DENSE BREASTS, UNSPECIFIED: ICD-10-CM

## 2025-07-21 DIAGNOSIS — E66.9 OVERWEIGHT: ICD-10-CM

## 2025-07-21 DIAGNOSIS — E66.3 OVERWEIGHT: ICD-10-CM

## 2025-07-21 DIAGNOSIS — Z12.39 ENCOUNTER FOR OTHER SCREENING FOR MALIGNANT NEOPLASM OF BREAST: ICD-10-CM

## 2025-07-21 PROCEDURE — 99214 OFFICE O/P EST MOD 30 MIN: CPT

## 2025-07-21 PROCEDURE — G2211 COMPLEX E/M VISIT ADD ON: CPT | Mod: NC

## 2025-08-15 ENCOUNTER — APPOINTMENT (OUTPATIENT)
Dept: INTERNAL MEDICINE | Facility: CLINIC | Age: 55
End: 2025-08-15

## 2025-08-19 ENCOUNTER — APPOINTMENT (OUTPATIENT)
Dept: INTERNAL MEDICINE | Facility: CLINIC | Age: 55
End: 2025-08-19
Payer: COMMERCIAL

## 2025-08-19 VITALS
OXYGEN SATURATION: 99 % | DIASTOLIC BLOOD PRESSURE: 72 MMHG | HEART RATE: 88 BPM | TEMPERATURE: 97 F | WEIGHT: 157.8 LBS | HEIGHT: 64 IN | BODY MASS INDEX: 26.94 KG/M2 | SYSTOLIC BLOOD PRESSURE: 108 MMHG

## 2025-08-19 DIAGNOSIS — K59.00 CONSTIPATION, UNSPECIFIED: ICD-10-CM

## 2025-08-19 DIAGNOSIS — E66.3 OVERWEIGHT: ICD-10-CM

## 2025-08-19 DIAGNOSIS — K21.9 GASTRO-ESOPHAGEAL REFLUX DISEASE W/OUT ESOPHAGITIS: ICD-10-CM

## 2025-08-19 DIAGNOSIS — E66.9 OVERWEIGHT: ICD-10-CM

## 2025-08-19 PROCEDURE — G2211 COMPLEX E/M VISIT ADD ON: CPT | Mod: NC

## 2025-08-19 PROCEDURE — 99214 OFFICE O/P EST MOD 30 MIN: CPT

## 2025-08-20 PROBLEM — K59.00 CONSTIPATION: Status: ACTIVE | Noted: 2025-08-20

## 2025-08-20 PROBLEM — K21.9 GERD (GASTROESOPHAGEAL REFLUX DISEASE): Status: ACTIVE | Noted: 2025-08-20

## 2025-08-20 RX ORDER — FAMOTIDINE 40 MG/1
40 TABLET, FILM COATED ORAL
Qty: 30 | Refills: 0 | Status: ACTIVE | COMMUNITY
Start: 2025-08-20 | End: 1900-01-01

## 2025-08-26 ENCOUNTER — APPOINTMENT (OUTPATIENT)
Dept: INTERNAL MEDICINE | Facility: CLINIC | Age: 55
End: 2025-08-26

## 2025-08-28 ENCOUNTER — APPOINTMENT (OUTPATIENT)
Dept: MAMMOGRAPHY | Facility: CLINIC | Age: 55
End: 2025-08-28
Payer: COMMERCIAL

## 2025-08-28 ENCOUNTER — OUTPATIENT (OUTPATIENT)
Dept: OUTPATIENT SERVICES | Facility: HOSPITAL | Age: 55
LOS: 1 days | End: 2025-08-28
Payer: COMMERCIAL

## 2025-08-28 ENCOUNTER — RESULT REVIEW (OUTPATIENT)
Age: 55
End: 2025-08-28

## 2025-08-28 ENCOUNTER — APPOINTMENT (OUTPATIENT)
Dept: ULTRASOUND IMAGING | Facility: CLINIC | Age: 55
End: 2025-08-28
Payer: COMMERCIAL

## 2025-08-28 DIAGNOSIS — Z98.890 OTHER SPECIFIED POSTPROCEDURAL STATES: Chronic | ICD-10-CM

## 2025-08-28 DIAGNOSIS — R92.30 DENSE BREASTS, UNSPECIFIED: ICD-10-CM

## 2025-08-28 DIAGNOSIS — Z00.8 ENCOUNTER FOR OTHER GENERAL EXAMINATION: ICD-10-CM

## 2025-08-28 DIAGNOSIS — Z12.39 ENCOUNTER FOR OTHER SCREENING FOR MALIGNANT NEOPLASM OF BREAST: ICD-10-CM

## 2025-08-28 PROCEDURE — 77067 SCR MAMMO BI INCL CAD: CPT | Mod: 26

## 2025-08-28 PROCEDURE — 77067 SCR MAMMO BI INCL CAD: CPT

## 2025-08-28 PROCEDURE — 76641 ULTRASOUND BREAST COMPLETE: CPT

## 2025-08-28 PROCEDURE — 77063 BREAST TOMOSYNTHESIS BI: CPT | Mod: 26

## 2025-08-28 PROCEDURE — 76641 ULTRASOUND BREAST COMPLETE: CPT | Mod: 26,50

## 2025-08-28 PROCEDURE — 77063 BREAST TOMOSYNTHESIS BI: CPT

## 2025-09-10 ENCOUNTER — APPOINTMENT (OUTPATIENT)
Dept: INTERNAL MEDICINE | Facility: CLINIC | Age: 55
End: 2025-09-10
Payer: COMMERCIAL

## 2025-09-10 VITALS
DIASTOLIC BLOOD PRESSURE: 70 MMHG | OXYGEN SATURATION: 99 % | WEIGHT: 153 LBS | TEMPERATURE: 97.2 F | HEIGHT: 64 IN | SYSTOLIC BLOOD PRESSURE: 110 MMHG | HEART RATE: 91 BPM | BODY MASS INDEX: 26.12 KG/M2

## 2025-09-10 DIAGNOSIS — E66.9 OVERWEIGHT: ICD-10-CM

## 2025-09-10 DIAGNOSIS — E66.3 OVERWEIGHT: ICD-10-CM

## 2025-09-10 PROCEDURE — 99213 OFFICE O/P EST LOW 20 MIN: CPT

## 2025-09-10 PROCEDURE — G2211 COMPLEX E/M VISIT ADD ON: CPT | Mod: NC

## 2025-09-16 ENCOUNTER — RX RENEWAL (OUTPATIENT)
Age: 55
End: 2025-09-16

## 2025-09-16 ENCOUNTER — APPOINTMENT (OUTPATIENT)
Dept: INTERNAL MEDICINE | Facility: CLINIC | Age: 55
End: 2025-09-16

## 2025-09-16 ENCOUNTER — APPOINTMENT (OUTPATIENT)
Facility: CLINIC | Age: 55
End: 2025-09-16
Payer: COMMERCIAL

## 2025-09-16 VITALS — WEIGHT: 153 LBS | HEIGHT: 64 IN | BODY MASS INDEX: 26.12 KG/M2

## 2025-09-16 DIAGNOSIS — Z87.39 PERSONAL HISTORY OF OTHER DISEASES OF THE MUSCULOSKELETAL SYSTEM AND CONNECTIVE TISSUE: ICD-10-CM

## 2025-09-16 DIAGNOSIS — M77.11 LATERAL EPICONDYLITIS, RIGHT ELBOW: ICD-10-CM

## 2025-09-16 PROCEDURE — 73080 X-RAY EXAM OF ELBOW: CPT | Mod: RT

## 2025-09-16 PROCEDURE — 99203 OFFICE O/P NEW LOW 30 MIN: CPT | Mod: 25

## 2025-09-16 PROCEDURE — 20551 NJX 1 TENDON ORIGIN/INSJ: CPT | Mod: RT

## 2025-09-17 PROBLEM — M77.11 LATERAL EPICONDYLITIS OF RIGHT ELBOW: Status: ACTIVE | Noted: 2025-09-17

## 2025-09-17 PROBLEM — Z87.39 HISTORY OF LATERAL EPICONDYLITIS OF RIGHT ELBOW: Status: RESOLVED | Noted: 2025-09-16 | Resolved: 2025-09-17

## (undated) DEVICE — MARKER ENDO SPOT EX

## (undated) DEVICE — SNARE POLYP SENS 27MM 240CM

## (undated) DEVICE — CATH IV SAFE BC 20G X 1.16" (PINK)

## (undated) DEVICE — SYR IV POSIFLUSH NS 3ML 30/TY

## (undated) DEVICE — SOL IRR POUR H2O 500ML

## (undated) DEVICE — FORCEP RADIAL JAW 4 W NDL 2.4MM 2.8MM 240CM ORANGE DISP

## (undated) DEVICE — Device

## (undated) DEVICE — SENSOR O2 FINGER XL ADULT 24/BX 6BX/CA

## (undated) DEVICE — RETRIEVER ROTH NET PLATINUM-UNIVERSAL

## (undated) DEVICE — VALVE BIOPSY

## (undated) DEVICE — TUBING IV SET GRAVITY 3Y 100" MACRO

## (undated) DEVICE — NDL INJ SCLERO INTERJECT 23G

## (undated) DEVICE — FORMALIN CUPS 10% BUFFERED

## (undated) DEVICE — TUBING SUCTION CONN 6FT STERILE

## (undated) DEVICE — PACK IV START WITH CHG

## (undated) DEVICE — TUBE O2 SUPL CRUSH RESIS CONN SOUTHSIDE ONLY

## (undated) DEVICE — TUBING CANNULA SALTER LABS NASAL ADULT 7FT

## (undated) DEVICE — SYR LUER SLIP TIP 50CC

## (undated) DEVICE — CATH IV SAFE BC 22G X 1" (BLUE)

## (undated) DEVICE — STERIS DEFENDO 3-PIECE KIT (AIR/WATER, SUCTION & BIOPSY VALVES)

## (undated) DEVICE — SUT HEWSON RETRIEVER

## (undated) DEVICE — ENDOCUFF VISION SZ 2 LG GRN

## (undated) DEVICE — TUBING IV SET SECONDARY 34"

## (undated) DEVICE — POLY TRAP ETRAP

## (undated) DEVICE — FORCEP RADIAL JAW 4 JUMBO 2.8MM 3.2MM 240CM ORANGE DISP

## (undated) DEVICE — TUBE RECTAL 24FR

## (undated) DEVICE — TRAP QUICK CATCH  SINGL CHAMBER

## (undated) DEVICE — SNARE LRG

## (undated) DEVICE — ELCTR GROUNDING PAD ADULT COVIDIEN

## (undated) DEVICE — MASK LRG MED AND HIGH O2 CONC M TO M 10FT

## (undated) DEVICE — MASK O2 NON REBREATH 3IN1 ADULT

## (undated) DEVICE — SUCTION YANKAUER TAPERED BULBOUS NO VENT

## (undated) DEVICE — ENDOCUFF VISION SZ 3 SM PRPL

## (undated) DEVICE — CANISTER SUCTION 1200CC 10/SL

## (undated) DEVICE — BRUSH COLONOSCOPY CYTOLOGY

## (undated) DEVICE — SNARE CAPTIVATOR II RND COLD 10MM

## (undated) DEVICE — SYR LUER SLIP TIP 30CC